# Patient Record
Sex: FEMALE | Race: WHITE | NOT HISPANIC OR LATINO | Employment: OTHER | URBAN - METROPOLITAN AREA
[De-identification: names, ages, dates, MRNs, and addresses within clinical notes are randomized per-mention and may not be internally consistent; named-entity substitution may affect disease eponyms.]

---

## 2021-03-11 ENCOUNTER — TRANSFERRED RECORDS (OUTPATIENT)
Dept: MULTI SPECIALTY CLINIC | Facility: CLINIC | Age: 26
End: 2021-03-11

## 2021-03-11 LAB
HPV ABSTRACT: NORMAL
PAP-ABSTRACT: NORMAL

## 2022-12-22 LAB — RUBELLA ANTIBODY IGG (EXTERNAL): NORMAL

## 2023-02-20 LAB
HEPATITIS B SURFACE ANTIGEN (EXTERNAL): NEGATIVE
HIV1+2 AB SERPL QL IA: NEGATIVE

## 2023-07-11 LAB — GROUP B STREPTOCOCCUS (EXTERNAL): NEGATIVE

## 2023-07-13 ENCOUNTER — HOSPITAL ENCOUNTER (OUTPATIENT)
Facility: CLINIC | Age: 28
Discharge: HOME | End: 2023-07-13
Attending: OBSTETRICS & GYNECOLOGY | Admitting: OBSTETRICS & GYNECOLOGY
Payer: COMMERCIAL

## 2023-07-13 VITALS — DIASTOLIC BLOOD PRESSURE: 89 MMHG | SYSTOLIC BLOOD PRESSURE: 137 MMHG | TEMPERATURE: 98.5 F | RESPIRATION RATE: 20 BRPM

## 2023-07-13 LAB
ALBUMIN MFR UR ELPH: 14.6 MG/DL
ALBUMIN SERPL BCG-MCNC: 3.4 G/DL (ref 3.5–5.2)
ALP SERPL-CCNC: 134 U/L (ref 35–104)
ALT SERPL W P-5'-P-CCNC: 8 U/L (ref 0–50)
ANION GAP SERPL CALCULATED.3IONS-SCNC: 11 MMOL/L (ref 7–15)
AST SERPL W P-5'-P-CCNC: 18 U/L (ref 0–45)
BILIRUB SERPL-MCNC: 0.6 MG/DL
BUN SERPL-MCNC: 7.4 MG/DL (ref 6–20)
CALCIUM SERPL-MCNC: 9.2 MG/DL (ref 8.6–10)
CHLORIDE SERPL-SCNC: 104 MMOL/L (ref 98–107)
CREAT SERPL-MCNC: 0.58 MG/DL (ref 0.51–0.95)
CREAT UR-MCNC: 100.9 MG/DL
DEPRECATED HCO3 PLAS-SCNC: 19 MMOL/L (ref 22–29)
ERYTHROCYTE [DISTWIDTH] IN BLOOD BY AUTOMATED COUNT: 13.5 % (ref 10–15)
GFR SERPL CREATININE-BSD FRML MDRD: >90 ML/MIN/1.73M2
GLUCOSE SERPL-MCNC: 109 MG/DL (ref 70–99)
HCT VFR BLD AUTO: 37.7 % (ref 35–47)
HGB BLD-MCNC: 12.8 G/DL (ref 11.7–15.7)
MCH RBC QN AUTO: 29.8 PG (ref 26.5–33)
MCHC RBC AUTO-ENTMCNC: 34 G/DL (ref 31.5–36.5)
MCV RBC AUTO: 88 FL (ref 78–100)
PLATELET # BLD AUTO: 239 10E3/UL (ref 150–450)
POTASSIUM SERPL-SCNC: 3.7 MMOL/L (ref 3.4–5.3)
PROT SERPL-MCNC: 6.5 G/DL (ref 6.4–8.3)
PROT/CREAT 24H UR: 0.14 MG/MG CR (ref 0–0.2)
RBC # BLD AUTO: 4.3 10E6/UL (ref 3.8–5.2)
SODIUM SERPL-SCNC: 134 MMOL/L (ref 136–145)
WBC # BLD AUTO: 13 10E3/UL (ref 4–11)

## 2023-07-13 PROCEDURE — 250N000013 HC RX MED GY IP 250 OP 250 PS 637: Performed by: OBSTETRICS & GYNECOLOGY

## 2023-07-13 PROCEDURE — 85027 COMPLETE CBC AUTOMATED: CPT | Performed by: OBSTETRICS & GYNECOLOGY

## 2023-07-13 PROCEDURE — 36415 COLL VENOUS BLD VENIPUNCTURE: CPT | Performed by: OBSTETRICS & GYNECOLOGY

## 2023-07-13 PROCEDURE — 84156 ASSAY OF PROTEIN URINE: CPT | Performed by: OBSTETRICS & GYNECOLOGY

## 2023-07-13 PROCEDURE — 80053 COMPREHEN METABOLIC PANEL: CPT | Performed by: OBSTETRICS & GYNECOLOGY

## 2023-07-13 PROCEDURE — G0463 HOSPITAL OUTPT CLINIC VISIT: HCPCS

## 2023-07-13 RX ORDER — CETIRIZINE HYDROCHLORIDE 10 MG/1
20 TABLET ORAL DAILY
COMMUNITY

## 2023-07-13 RX ORDER — ACETAMINOPHEN 500 MG
500-1000 TABLET ORAL EVERY 6 HOURS PRN
COMMUNITY

## 2023-07-13 RX ORDER — CYCLOBENZAPRINE HCL 10 MG
10 TABLET ORAL 3 TIMES DAILY
Status: DISCONTINUED | OUTPATIENT
Start: 2023-07-13 | End: 2023-07-14 | Stop reason: HOSPADM

## 2023-07-13 RX ORDER — PRENATAL VIT/IRON FUM/FOLIC AC 27MG-0.8MG
1 TABLET ORAL DAILY
COMMUNITY

## 2023-07-13 RX ORDER — FLUOXETINE 40 MG/1
80 CAPSULE ORAL DAILY
COMMUNITY

## 2023-07-13 RX ORDER — ONDANSETRON 8 MG/1
8 TABLET, FILM COATED ORAL EVERY 8 HOURS PRN
COMMUNITY
End: 2023-08-01

## 2023-07-13 RX ORDER — ASPIRIN 81 MG/1
81 TABLET, CHEWABLE ORAL DAILY
Status: ON HOLD | COMMUNITY
End: 2023-07-28

## 2023-07-13 RX ADMIN — CYCLOBENZAPRINE 10 MG: 10 TABLET, FILM COATED ORAL at 22:10

## 2023-07-13 ASSESSMENT — ACTIVITIES OF DAILY LIVING (ADL): ADLS_ACUITY_SCORE: 31

## 2023-07-14 NOTE — DISCHARGE INSTRUCTIONS
Discharge Instruction for Undelivered Patients      You were seen for:  abdominal tightness and ALONZO  We Consulted: MD Blackwell  You had (Test or Medicine): Flexeril, NST, CBC with platelets, CMP, protein cr ratio      Diet:   Drink 8 to 12 glasses of liquids (milk, juice, water) every day.  You may eat meals and snacks.     Activity:  Count fetal kicks everyday (see handout)  Call your doctor or nurse midwife if your baby is moving less than usual.     Call your provider if you notice:  Swelling in your face or increased swelling in your hands or legs.  Headaches that are not relieved by Tylenol (acetaminophen).  Changes in your vision (blurring: seeing spots or stars.)  Nausea (sick to your stomach) and vomiting (throwing up).   Weight gain of 5 pounds or more per week.  Heartburn that doesn't go away.  Signs of bladder infection: pain when you urinate (use the toilet), need to go more often and more urgently.  The bag of brown (rupture of membranes) breaks, or you notice leaking in your underwear.  Bright red blood in your underwear.  Abdominal (lower belly) or stomach pain.  For first baby: Contractions (tightening) less than 5 minutes apart for one hour or more.  Second (plus) baby: Contractions (tightening) less than 10 minutes apart and getting stronger.  *If less than 34 weeks: Contractions (tightening) more than 6 times in one hour.  Increase or change in vaginal discharge (note the color and amount)      Follow-up:  As scheduled in the clinic

## 2023-07-14 NOTE — PROVIDER NOTIFICATION
07/13/23 2229   Provider Notification   Provider Name/Title MD Blackwell   Method of Notification Phone   Notification Reason Status Update   Updated provider with labs results and status update. CBC with platelets and CMP including protein cr ratio WNL. Pt given flexeril at 2210. Pt feeling intermittent cramping, toco presents with intermittent ctx. FHT remains christiano I    TORB to place discharge orders and give pertinent education. Pt and partner agreeable with plan. Discharged at 2250

## 2023-07-14 NOTE — PROVIDER NOTIFICATION
23   Provider Notification   Provider Name/Title MD Blackwell   Method of Notification Phone   Notification Reason Patient Arrived   Updated provider on patient arrival. Pt is a , 36.4, A pos, GBS pending. Pregnancy complicated with hyperemesis and MDD/DARLING. Pt c/o HA that began yesterday morning, has been dull and constant, 2/10 pain, not resolved with 500mg of tylenol. Pt also c/o abdominal tightness and uterine cramping that only occurs with postural changes and when the pt puts pressure on her abdomen. Pt states that she has had some int RUQ muscle pain for the past few weeks, not currently experiencing pain. Also c/o more than 3lb weight gain within the week, states she has been doing IV hydration therapy. Pt states she noticed bloody mucousy vaginal discharge after her cervical exam this past Tuesday.     VSS and HTT WNL with exception of 3+ bicep reflex on right side, no significant edema, no other s/s of preE. Haigler presents with ctx q 4-5 min with uterine irritability, FHT christiano I, 1x decel. Pt states she took tylenol at 5pm and HA remains. Pt has constant nausea, has not had an emesis episode today.     TORB for preE labs- CBC with plates, CMP, protein cr ratio. Place order for 10 mg of flexeril for muscle pain. Will update with lab results

## 2023-07-14 NOTE — PLAN OF CARE
Data: Patient presented to [facility]: 2023  7:49 PM.  Reason for maternal/fetal assessment is abdominal tightness and HA. Patient reports having a dull constant HA since yesterday morning, not resolved with tylenol. Also reports intermittent abdominal/uterine tightness and cramping with postural changes and pressure is applied to abdomen.  Patient is a .  Prenatal record reviewed. Pregnancy  has been complicated by hyperemesis gravidarum and MDD/DARLING.  Gestational Age 36w4d. VSS. Fetal movement active. Patient denies leaking of vaginal fluid/rupture of membranes, visual disturbances, epigastric or URQ pain, significant edema. Support person is present.   Action: Verbal consent for EFM. Triage assessment completed. Bill of rights reviewed.  Response: Patient verbalized agreement with plan. Will contact Dr Peyton Blackwell with update and for further orders.

## 2023-07-24 ENCOUNTER — HOSPITAL ENCOUNTER (OUTPATIENT)
Facility: CLINIC | Age: 28
Discharge: HOME | End: 2023-07-24
Attending: OBSTETRICS & GYNECOLOGY | Admitting: OBSTETRICS & GYNECOLOGY
Payer: COMMERCIAL

## 2023-07-24 VITALS — RESPIRATION RATE: 16 BRPM | TEMPERATURE: 98.9 F | DIASTOLIC BLOOD PRESSURE: 77 MMHG | SYSTOLIC BLOOD PRESSURE: 138 MMHG

## 2023-07-24 PROBLEM — Z36.89 ENCOUNTER FOR TRIAGE IN PREGNANT PATIENT: Status: ACTIVE | Noted: 2023-07-24

## 2023-07-24 LAB — CRYSTALS AMN MICRO: NORMAL

## 2023-07-24 PROCEDURE — G0463 HOSPITAL OUTPT CLINIC VISIT: HCPCS

## 2023-07-24 RX ORDER — LIDOCAINE 40 MG/G
CREAM TOPICAL
Status: DISCONTINUED | OUTPATIENT
Start: 2023-07-24 | End: 2023-07-24 | Stop reason: HOSPADM

## 2023-07-24 ASSESSMENT — ACTIVITIES OF DAILY LIVING (ADL): ADLS_ACUITY_SCORE: 35

## 2023-07-25 ENCOUNTER — ANESTHESIA (OUTPATIENT)
Dept: OBGYN | Facility: CLINIC | Age: 28
End: 2023-07-25
Payer: COMMERCIAL

## 2023-07-25 ENCOUNTER — HOSPITAL ENCOUNTER (INPATIENT)
Facility: CLINIC | Age: 28
LOS: 3 days | Discharge: HOME | End: 2023-07-28
Attending: OBSTETRICS & GYNECOLOGY | Admitting: OBSTETRICS & GYNECOLOGY
Payer: COMMERCIAL

## 2023-07-25 ENCOUNTER — ANESTHESIA EVENT (OUTPATIENT)
Dept: OBGYN | Facility: CLINIC | Age: 28
End: 2023-07-25
Payer: COMMERCIAL

## 2023-07-25 DIAGNOSIS — O14.93 PREECLAMPSIA, THIRD TRIMESTER: ICD-10-CM

## 2023-07-25 LAB
ABO/RH(D): NORMAL
ALBUMIN MFR UR ELPH: 17.7 MG/DL
ALBUMIN SERPL BCG-MCNC: 2.6 G/DL (ref 3.5–5.2)
ALBUMIN SERPL BCG-MCNC: 3.1 G/DL (ref 3.5–5.2)
ALP SERPL-CCNC: 121 U/L (ref 35–104)
ALP SERPL-CCNC: 131 U/L (ref 35–104)
ALT SERPL W P-5'-P-CCNC: 7 U/L (ref 0–50)
ALT SERPL W P-5'-P-CCNC: 8 U/L (ref 0–50)
ANION GAP SERPL CALCULATED.3IONS-SCNC: 10 MMOL/L (ref 7–15)
ANION GAP SERPL CALCULATED.3IONS-SCNC: 14 MMOL/L (ref 7–15)
ANTIBODY SCREEN: NEGATIVE
AST SERPL W P-5'-P-CCNC: 17 U/L (ref 0–45)
AST SERPL W P-5'-P-CCNC: 19 U/L (ref 0–45)
BASOPHILS # BLD AUTO: 0 10E3/UL (ref 0–0.2)
BASOPHILS NFR BLD AUTO: 0 %
BILIRUB SERPL-MCNC: 0.4 MG/DL
BILIRUB SERPL-MCNC: 0.4 MG/DL
BUN SERPL-MCNC: 7.7 MG/DL (ref 6–20)
BUN SERPL-MCNC: 8.3 MG/DL (ref 6–20)
CALCIUM SERPL-MCNC: 8.8 MG/DL (ref 8.6–10)
CALCIUM SERPL-MCNC: 9 MG/DL (ref 8.6–10)
CHLORIDE SERPL-SCNC: 101 MMOL/L (ref 98–107)
CHLORIDE SERPL-SCNC: 101 MMOL/L (ref 98–107)
CREAT SERPL-MCNC: 0.6 MG/DL (ref 0.51–0.95)
CREAT SERPL-MCNC: 0.62 MG/DL (ref 0.51–0.95)
CREAT UR-MCNC: 45.4 MG/DL
DEPRECATED HCO3 PLAS-SCNC: 19 MMOL/L (ref 22–29)
DEPRECATED HCO3 PLAS-SCNC: 20 MMOL/L (ref 22–29)
EOSINOPHIL # BLD AUTO: 0 10E3/UL (ref 0–0.7)
EOSINOPHIL NFR BLD AUTO: 0 %
ERYTHROCYTE [DISTWIDTH] IN BLOOD BY AUTOMATED COUNT: 13.5 % (ref 10–15)
ERYTHROCYTE [DISTWIDTH] IN BLOOD BY AUTOMATED COUNT: 13.6 % (ref 10–15)
GFR SERPL CREATININE-BSD FRML MDRD: >90 ML/MIN/1.73M2
GFR SERPL CREATININE-BSD FRML MDRD: >90 ML/MIN/1.73M2
GLUCOSE SERPL-MCNC: 101 MG/DL (ref 70–99)
GLUCOSE SERPL-MCNC: 114 MG/DL (ref 70–99)
HCT VFR BLD AUTO: 30.4 % (ref 35–47)
HCT VFR BLD AUTO: 35.4 % (ref 35–47)
HGB BLD-MCNC: 10.4 G/DL (ref 11.7–15.7)
HGB BLD-MCNC: 12 G/DL (ref 11.7–15.7)
IMM GRANULOCYTES # BLD: 0.1 10E3/UL
IMM GRANULOCYTES NFR BLD: 0 %
LYMPHOCYTES # BLD AUTO: 1.8 10E3/UL (ref 0.8–5.3)
LYMPHOCYTES NFR BLD AUTO: 11 %
MCH RBC QN AUTO: 29.8 PG (ref 26.5–33)
MCH RBC QN AUTO: 30.1 PG (ref 26.5–33)
MCHC RBC AUTO-ENTMCNC: 33.9 G/DL (ref 31.5–36.5)
MCHC RBC AUTO-ENTMCNC: 34.2 G/DL (ref 31.5–36.5)
MCV RBC AUTO: 88 FL (ref 78–100)
MCV RBC AUTO: 88 FL (ref 78–100)
MONOCYTES # BLD AUTO: 0.8 10E3/UL (ref 0–1.3)
MONOCYTES NFR BLD AUTO: 5 %
NEUTROPHILS # BLD AUTO: 13.1 10E3/UL (ref 1.6–8.3)
NEUTROPHILS NFR BLD AUTO: 84 %
NRBC # BLD AUTO: 0 10E3/UL
NRBC BLD AUTO-RTO: 0 /100
PLATELET # BLD AUTO: 170 10E3/UL (ref 150–450)
PLATELET # BLD AUTO: 204 10E3/UL (ref 150–450)
POTASSIUM SERPL-SCNC: 3.9 MMOL/L (ref 3.4–5.3)
POTASSIUM SERPL-SCNC: 4.1 MMOL/L (ref 3.4–5.3)
PROT SERPL-MCNC: 5.2 G/DL (ref 6.4–8.3)
PROT SERPL-MCNC: 6.2 G/DL (ref 6.4–8.3)
PROT/CREAT 24H UR: 0.39 MG/MG CR (ref 0–0.2)
RBC # BLD AUTO: 3.45 10E6/UL (ref 3.8–5.2)
RBC # BLD AUTO: 4.03 10E6/UL (ref 3.8–5.2)
SODIUM SERPL-SCNC: 131 MMOL/L (ref 136–145)
SODIUM SERPL-SCNC: 134 MMOL/L (ref 136–145)
SPECIMEN EXPIRATION DATE: NORMAL
T PALLIDUM AB SER QL: NONREACTIVE
WBC # BLD AUTO: 10.6 10E3/UL (ref 4–11)
WBC # BLD AUTO: 15.8 10E3/UL (ref 4–11)

## 2023-07-25 PROCEDURE — 250N000009 HC RX 250: Performed by: OBSTETRICS & GYNECOLOGY

## 2023-07-25 PROCEDURE — 36415 COLL VENOUS BLD VENIPUNCTURE: CPT | Performed by: OBSTETRICS & GYNECOLOGY

## 2023-07-25 PROCEDURE — 250N000013 HC RX MED GY IP 250 OP 250 PS 637: Performed by: OBSTETRICS & GYNECOLOGY

## 2023-07-25 PROCEDURE — 84450 TRANSFERASE (AST) (SGOT): CPT | Performed by: OBSTETRICS & GYNECOLOGY

## 2023-07-25 PROCEDURE — 370N000003 HC ANESTHESIA WARD SERVICE: Performed by: ANESTHESIOLOGY

## 2023-07-25 PROCEDURE — 86901 BLOOD TYPING SEROLOGIC RH(D): CPT | Performed by: OBSTETRICS & GYNECOLOGY

## 2023-07-25 PROCEDURE — 120N000001 HC R&B MED SURG/OB

## 2023-07-25 PROCEDURE — 250N000011 HC RX IP 250 OP 636: Mod: JZ | Performed by: ANESTHESIOLOGY

## 2023-07-25 PROCEDURE — 250N000011 HC RX IP 250 OP 636: Performed by: ANESTHESIOLOGY

## 2023-07-25 PROCEDURE — 0KQM0ZZ REPAIR PERINEUM MUSCLE, OPEN APPROACH: ICD-10-PCS | Performed by: OBSTETRICS & GYNECOLOGY

## 2023-07-25 PROCEDURE — 3E0R3BZ INTRODUCTION OF ANESTHETIC AGENT INTO SPINAL CANAL, PERCUTANEOUS APPROACH: ICD-10-PCS | Performed by: ANESTHESIOLOGY

## 2023-07-25 PROCEDURE — 258N000003 HC RX IP 258 OP 636: Performed by: OBSTETRICS & GYNECOLOGY

## 2023-07-25 PROCEDURE — 00HU33Z INSERTION OF INFUSION DEVICE INTO SPINAL CANAL, PERCUTANEOUS APPROACH: ICD-10-PCS | Performed by: ANESTHESIOLOGY

## 2023-07-25 PROCEDURE — 86780 TREPONEMA PALLIDUM: CPT | Performed by: OBSTETRICS & GYNECOLOGY

## 2023-07-25 PROCEDURE — 722N000001 HC LABOR CARE VAGINAL DELIVERY SINGLE

## 2023-07-25 PROCEDURE — 10907ZC DRAINAGE OF AMNIOTIC FLUID, THERAPEUTIC FROM PRODUCTS OF CONCEPTION, VIA NATURAL OR ARTIFICIAL OPENING: ICD-10-PCS | Performed by: OBSTETRICS & GYNECOLOGY

## 2023-07-25 PROCEDURE — 250N000011 HC RX IP 250 OP 636: Performed by: OBSTETRICS & GYNECOLOGY

## 2023-07-25 PROCEDURE — 86850 RBC ANTIBODY SCREEN: CPT | Performed by: OBSTETRICS & GYNECOLOGY

## 2023-07-25 PROCEDURE — 84156 ASSAY OF PROTEIN URINE: CPT | Performed by: OBSTETRICS & GYNECOLOGY

## 2023-07-25 PROCEDURE — 250N000011 HC RX IP 250 OP 636: Mod: JZ | Performed by: OBSTETRICS & GYNECOLOGY

## 2023-07-25 PROCEDURE — 250N000009 HC RX 250: Performed by: ANESTHESIOLOGY

## 2023-07-25 PROCEDURE — 0UQMXZZ REPAIR VULVA, EXTERNAL APPROACH: ICD-10-PCS | Performed by: OBSTETRICS & GYNECOLOGY

## 2023-07-25 PROCEDURE — 85014 HEMATOCRIT: CPT | Performed by: OBSTETRICS & GYNECOLOGY

## 2023-07-25 PROCEDURE — 85027 COMPLETE CBC AUTOMATED: CPT | Performed by: OBSTETRICS & GYNECOLOGY

## 2023-07-25 PROCEDURE — 80053 COMPREHEN METABOLIC PANEL: CPT | Performed by: OBSTETRICS & GYNECOLOGY

## 2023-07-25 RX ORDER — TRANEXAMIC ACID 10 MG/ML
1 INJECTION, SOLUTION INTRAVENOUS EVERY 30 MIN PRN
Status: DISCONTINUED | OUTPATIENT
Start: 2023-07-25 | End: 2023-07-25 | Stop reason: HOSPADM

## 2023-07-25 RX ORDER — HYDROCORTISONE 25 MG/G
CREAM TOPICAL 3 TIMES DAILY PRN
Status: DISCONTINUED | OUTPATIENT
Start: 2023-07-25 | End: 2023-07-28 | Stop reason: HOSPADM

## 2023-07-25 RX ORDER — IBUPROFEN 800 MG/1
800 TABLET, FILM COATED ORAL EVERY 6 HOURS PRN
Status: DISCONTINUED | OUTPATIENT
Start: 2023-07-25 | End: 2023-07-28 | Stop reason: HOSPADM

## 2023-07-25 RX ORDER — OXYTOCIN/0.9 % SODIUM CHLORIDE 30/500 ML
340 PLASTIC BAG, INJECTION (ML) INTRAVENOUS CONTINUOUS PRN
Status: DISCONTINUED | OUTPATIENT
Start: 2023-07-25 | End: 2023-07-28 | Stop reason: HOSPADM

## 2023-07-25 RX ORDER — CETIRIZINE HYDROCHLORIDE 10 MG/1
20 TABLET ORAL DAILY
Status: DISCONTINUED | OUTPATIENT
Start: 2023-07-25 | End: 2023-07-28 | Stop reason: HOSPADM

## 2023-07-25 RX ORDER — MISOPROSTOL 200 UG/1
400 TABLET ORAL
Status: DISCONTINUED | OUTPATIENT
Start: 2023-07-25 | End: 2023-07-25 | Stop reason: HOSPADM

## 2023-07-25 RX ORDER — OXYTOCIN 10 [USP'U]/ML
10 INJECTION, SOLUTION INTRAMUSCULAR; INTRAVENOUS
Status: DISCONTINUED | OUTPATIENT
Start: 2023-07-25 | End: 2023-07-27

## 2023-07-25 RX ORDER — NIFEDIPINE 30 MG/1
60 TABLET, EXTENDED RELEASE ORAL DAILY
Status: DISCONTINUED | OUTPATIENT
Start: 2023-07-26 | End: 2023-07-28 | Stop reason: HOSPADM

## 2023-07-25 RX ORDER — BISACODYL 10 MG
10 SUPPOSITORY, RECTAL RECTAL DAILY PRN
Status: DISCONTINUED | OUTPATIENT
Start: 2023-07-25 | End: 2023-07-28 | Stop reason: HOSPADM

## 2023-07-25 RX ORDER — CITRIC ACID/SODIUM CITRATE 334-500MG
30 SOLUTION, ORAL ORAL
Status: DISCONTINUED | OUTPATIENT
Start: 2023-07-25 | End: 2023-07-25 | Stop reason: HOSPADM

## 2023-07-25 RX ORDER — OXYTOCIN/0.9 % SODIUM CHLORIDE 30/500 ML
1-24 PLASTIC BAG, INJECTION (ML) INTRAVENOUS CONTINUOUS
Status: DISCONTINUED | OUTPATIENT
Start: 2023-07-25 | End: 2023-07-25 | Stop reason: HOSPADM

## 2023-07-25 RX ORDER — PROCHLORPERAZINE MALEATE 10 MG
10 TABLET ORAL EVERY 6 HOURS PRN
Status: DISCONTINUED | OUTPATIENT
Start: 2023-07-25 | End: 2023-07-25 | Stop reason: HOSPADM

## 2023-07-25 RX ORDER — ONDANSETRON 4 MG/1
4 TABLET, ORALLY DISINTEGRATING ORAL EVERY 6 HOURS PRN
Status: DISCONTINUED | OUTPATIENT
Start: 2023-07-25 | End: 2023-07-25 | Stop reason: HOSPADM

## 2023-07-25 RX ORDER — NALOXONE HYDROCHLORIDE 0.4 MG/ML
0.4 INJECTION, SOLUTION INTRAMUSCULAR; INTRAVENOUS; SUBCUTANEOUS
Status: DISCONTINUED | OUTPATIENT
Start: 2023-07-25 | End: 2023-07-25 | Stop reason: HOSPADM

## 2023-07-25 RX ORDER — METHYLERGONOVINE MALEATE 0.2 MG/ML
200 INJECTION INTRAVENOUS
Status: DISCONTINUED | OUTPATIENT
Start: 2023-07-25 | End: 2023-07-25 | Stop reason: HOSPADM

## 2023-07-25 RX ORDER — MISOPROSTOL 200 UG/1
400 TABLET ORAL
Status: DISCONTINUED | OUTPATIENT
Start: 2023-07-25 | End: 2023-07-28 | Stop reason: HOSPADM

## 2023-07-25 RX ORDER — ACETAMINOPHEN 325 MG/1
650 TABLET ORAL EVERY 4 HOURS PRN
Status: DISCONTINUED | OUTPATIENT
Start: 2023-07-25 | End: 2023-07-28 | Stop reason: HOSPADM

## 2023-07-25 RX ORDER — OXYTOCIN 10 [USP'U]/ML
10 INJECTION, SOLUTION INTRAMUSCULAR; INTRAVENOUS
Status: DISCONTINUED | OUTPATIENT
Start: 2023-07-25 | End: 2023-07-25 | Stop reason: HOSPADM

## 2023-07-25 RX ORDER — PROCHLORPERAZINE 25 MG
25 SUPPOSITORY, RECTAL RECTAL EVERY 12 HOURS PRN
Status: DISCONTINUED | OUTPATIENT
Start: 2023-07-25 | End: 2023-07-25 | Stop reason: HOSPADM

## 2023-07-25 RX ORDER — NALBUPHINE HYDROCHLORIDE 20 MG/ML
2.5-5 INJECTION, SOLUTION INTRAMUSCULAR; INTRAVENOUS; SUBCUTANEOUS EVERY 6 HOURS PRN
Status: DISCONTINUED | OUTPATIENT
Start: 2023-07-25 | End: 2023-07-25

## 2023-07-25 RX ORDER — BUPIVACAINE HYDROCHLORIDE 2.5 MG/ML
INJECTION, SOLUTION EPIDURAL; INFILTRATION; INTRACAUDAL
Status: COMPLETED | OUTPATIENT
Start: 2023-07-25 | End: 2023-07-25

## 2023-07-25 RX ORDER — FENTANYL CITRATE 50 UG/ML
100 INJECTION, SOLUTION INTRAMUSCULAR; INTRAVENOUS
Status: DISCONTINUED | OUTPATIENT
Start: 2023-07-25 | End: 2023-07-25

## 2023-07-25 RX ORDER — LIDOCAINE 40 MG/G
CREAM TOPICAL
Status: DISCONTINUED | OUTPATIENT
Start: 2023-07-25 | End: 2023-07-25 | Stop reason: HOSPADM

## 2023-07-25 RX ORDER — MISOPROSTOL 200 UG/1
800 TABLET ORAL
Status: DISCONTINUED | OUTPATIENT
Start: 2023-07-25 | End: 2023-07-28 | Stop reason: HOSPADM

## 2023-07-25 RX ORDER — OXYTOCIN/0.9 % SODIUM CHLORIDE 30/500 ML
340 PLASTIC BAG, INJECTION (ML) INTRAVENOUS CONTINUOUS PRN
Status: DISCONTINUED | OUTPATIENT
Start: 2023-07-25 | End: 2023-07-25 | Stop reason: HOSPADM

## 2023-07-25 RX ORDER — NIFEDIPINE 30 MG/1
30 TABLET, EXTENDED RELEASE ORAL DAILY
Status: DISCONTINUED | OUTPATIENT
Start: 2023-07-25 | End: 2023-07-25

## 2023-07-25 RX ORDER — KETOROLAC TROMETHAMINE 30 MG/ML
30 INJECTION, SOLUTION INTRAMUSCULAR; INTRAVENOUS
Status: DISCONTINUED | OUTPATIENT
Start: 2023-07-25 | End: 2023-07-25 | Stop reason: ALTCHOICE

## 2023-07-25 RX ORDER — HYDRALAZINE HYDROCHLORIDE 20 MG/ML
10 INJECTION INTRAMUSCULAR; INTRAVENOUS
Status: DISCONTINUED | OUTPATIENT
Start: 2023-07-25 | End: 2023-07-28 | Stop reason: HOSPADM

## 2023-07-25 RX ORDER — NIFEDIPINE 30 MG/1
30 TABLET, EXTENDED RELEASE ORAL ONCE
Status: COMPLETED | OUTPATIENT
Start: 2023-07-25 | End: 2023-07-25

## 2023-07-25 RX ORDER — FENTANYL CITRATE-0.9 % NACL/PF 10 MCG/ML
100 PLASTIC BAG, INJECTION (ML) INTRAVENOUS EVERY 5 MIN PRN
Status: DISCONTINUED | OUTPATIENT
Start: 2023-07-25 | End: 2023-07-25 | Stop reason: HOSPADM

## 2023-07-25 RX ORDER — DOCUSATE SODIUM 100 MG/1
100 CAPSULE, LIQUID FILLED ORAL DAILY
Status: DISCONTINUED | OUTPATIENT
Start: 2023-07-26 | End: 2023-07-28 | Stop reason: HOSPADM

## 2023-07-25 RX ORDER — SODIUM CHLORIDE, SODIUM LACTATE, POTASSIUM CHLORIDE, CALCIUM CHLORIDE 600; 310; 30; 20 MG/100ML; MG/100ML; MG/100ML; MG/100ML
INJECTION, SOLUTION INTRAVENOUS CONTINUOUS PRN
Status: DISCONTINUED | OUTPATIENT
Start: 2023-07-25 | End: 2023-07-25 | Stop reason: HOSPADM

## 2023-07-25 RX ORDER — SODIUM CHLORIDE, SODIUM LACTATE, POTASSIUM CHLORIDE, CALCIUM CHLORIDE 600; 310; 30; 20 MG/100ML; MG/100ML; MG/100ML; MG/100ML
INJECTION, SOLUTION INTRAVENOUS CONTINUOUS
Status: DISCONTINUED | OUTPATIENT
Start: 2023-07-25 | End: 2023-07-25 | Stop reason: HOSPADM

## 2023-07-25 RX ORDER — OXYTOCIN/0.9 % SODIUM CHLORIDE 30/500 ML
100-340 PLASTIC BAG, INJECTION (ML) INTRAVENOUS CONTINUOUS PRN
Status: DISCONTINUED | OUTPATIENT
Start: 2023-07-25 | End: 2023-07-27

## 2023-07-25 RX ORDER — NALOXONE HYDROCHLORIDE 0.4 MG/ML
0.2 INJECTION, SOLUTION INTRAMUSCULAR; INTRAVENOUS; SUBCUTANEOUS
Status: DISCONTINUED | OUTPATIENT
Start: 2023-07-25 | End: 2023-07-25 | Stop reason: HOSPADM

## 2023-07-25 RX ORDER — MISOPROSTOL 200 UG/1
800 TABLET ORAL
Status: DISCONTINUED | OUTPATIENT
Start: 2023-07-25 | End: 2023-07-25 | Stop reason: HOSPADM

## 2023-07-25 RX ORDER — ACETAMINOPHEN 325 MG/1
650 TABLET ORAL EVERY 4 HOURS PRN
Status: DISCONTINUED | OUTPATIENT
Start: 2023-07-25 | End: 2023-07-25 | Stop reason: HOSPADM

## 2023-07-25 RX ORDER — OXYTOCIN 10 [USP'U]/ML
10 INJECTION, SOLUTION INTRAMUSCULAR; INTRAVENOUS
Status: DISCONTINUED | OUTPATIENT
Start: 2023-07-25 | End: 2023-07-28 | Stop reason: HOSPADM

## 2023-07-25 RX ORDER — FENTANYL CITRATE 50 UG/ML
100 INJECTION, SOLUTION INTRAMUSCULAR; INTRAVENOUS
Status: DISCONTINUED | OUTPATIENT
Start: 2023-07-25 | End: 2023-07-25 | Stop reason: HOSPADM

## 2023-07-25 RX ORDER — LABETALOL HYDROCHLORIDE 5 MG/ML
20-80 INJECTION, SOLUTION INTRAVENOUS EVERY 10 MIN PRN
Status: DISCONTINUED | OUTPATIENT
Start: 2023-07-25 | End: 2023-07-28 | Stop reason: HOSPADM

## 2023-07-25 RX ORDER — AMOXICILLIN 250 MG
1 CAPSULE ORAL DAILY
COMMUNITY
End: 2023-08-01

## 2023-07-25 RX ORDER — METOCLOPRAMIDE 10 MG/1
10 TABLET ORAL EVERY 6 HOURS PRN
Status: DISCONTINUED | OUTPATIENT
Start: 2023-07-25 | End: 2023-07-25 | Stop reason: HOSPADM

## 2023-07-25 RX ORDER — CARBOPROST TROMETHAMINE 250 UG/ML
250 INJECTION, SOLUTION INTRAMUSCULAR
Status: DISCONTINUED | OUTPATIENT
Start: 2023-07-25 | End: 2023-07-28 | Stop reason: HOSPADM

## 2023-07-25 RX ORDER — CARBOPROST TROMETHAMINE 250 UG/ML
250 INJECTION, SOLUTION INTRAMUSCULAR
Status: DISCONTINUED | OUTPATIENT
Start: 2023-07-25 | End: 2023-07-25 | Stop reason: HOSPADM

## 2023-07-25 RX ORDER — TRANEXAMIC ACID 10 MG/ML
1 INJECTION, SOLUTION INTRAVENOUS EVERY 30 MIN PRN
Status: DISCONTINUED | OUTPATIENT
Start: 2023-07-25 | End: 2023-07-28 | Stop reason: HOSPADM

## 2023-07-25 RX ORDER — ONDANSETRON 2 MG/ML
4 INJECTION INTRAMUSCULAR; INTRAVENOUS EVERY 6 HOURS PRN
Status: DISCONTINUED | OUTPATIENT
Start: 2023-07-25 | End: 2023-07-25 | Stop reason: HOSPADM

## 2023-07-25 RX ORDER — METHYLERGONOVINE MALEATE 0.2 MG/ML
200 INJECTION INTRAVENOUS
Status: DISCONTINUED | OUTPATIENT
Start: 2023-07-25 | End: 2023-07-28 | Stop reason: HOSPADM

## 2023-07-25 RX ORDER — LIDOCAINE HCL/EPINEPHRINE/PF 2%-1:200K
VIAL (ML) INJECTION
Status: COMPLETED | OUTPATIENT
Start: 2023-07-25 | End: 2023-07-25

## 2023-07-25 RX ORDER — ENOXAPARIN SODIUM 100 MG/ML
40 INJECTION SUBCUTANEOUS EVERY 24 HOURS
Status: DISCONTINUED | OUTPATIENT
Start: 2023-07-25 | End: 2023-07-27

## 2023-07-25 RX ORDER — IBUPROFEN 800 MG/1
800 TABLET, FILM COATED ORAL
Status: DISCONTINUED | OUTPATIENT
Start: 2023-07-25 | End: 2023-07-25 | Stop reason: ALTCHOICE

## 2023-07-25 RX ORDER — MODIFIED LANOLIN
OINTMENT (GRAM) TOPICAL
Status: DISCONTINUED | OUTPATIENT
Start: 2023-07-25 | End: 2023-07-28 | Stop reason: HOSPADM

## 2023-07-25 RX ORDER — METOCLOPRAMIDE HYDROCHLORIDE 5 MG/ML
10 INJECTION INTRAMUSCULAR; INTRAVENOUS EVERY 6 HOURS PRN
Status: DISCONTINUED | OUTPATIENT
Start: 2023-07-25 | End: 2023-07-25 | Stop reason: HOSPADM

## 2023-07-25 RX ADMIN — IBUPROFEN 800 MG: 800 TABLET ORAL at 14:17

## 2023-07-25 RX ADMIN — FLUOXETINE 80 MG: 20 CAPSULE ORAL at 20:57

## 2023-07-25 RX ADMIN — SODIUM CHLORIDE, POTASSIUM CHLORIDE, SODIUM LACTATE AND CALCIUM CHLORIDE: 600; 310; 30; 20 INJECTION, SOLUTION INTRAVENOUS at 04:57

## 2023-07-25 RX ADMIN — MISOPROSTOL 400 MCG: 200 TABLET ORAL at 10:56

## 2023-07-25 RX ADMIN — FENTANYL CITRATE 100 MCG: 50 INJECTION, SOLUTION INTRAMUSCULAR; INTRAVENOUS at 04:44

## 2023-07-25 RX ADMIN — BUPIVACAINE HYDROCHLORIDE 10 ML: 2.5 INJECTION, SOLUTION EPIDURAL; INFILTRATION; INTRACAUDAL at 05:20

## 2023-07-25 RX ADMIN — Medication 2 MILLI-UNITS/MIN: at 08:30

## 2023-07-25 RX ADMIN — Medication: at 05:21

## 2023-07-25 RX ADMIN — CETIRIZINE HYDROCHLORIDE 20 MG: 10 TABLET, FILM COATED ORAL at 20:57

## 2023-07-25 RX ADMIN — ACETAMINOPHEN 650 MG: 325 TABLET, FILM COATED ORAL at 21:53

## 2023-07-25 RX ADMIN — IBUPROFEN 800 MG: 800 TABLET ORAL at 20:57

## 2023-07-25 RX ADMIN — NIFEDIPINE 30 MG: 30 TABLET, FILM COATED, EXTENDED RELEASE ORAL at 08:55

## 2023-07-25 RX ADMIN — ACETAMINOPHEN 650 MG: 325 TABLET, FILM COATED ORAL at 14:17

## 2023-07-25 RX ADMIN — ENOXAPARIN SODIUM 40 MG: 40 INJECTION SUBCUTANEOUS at 20:57

## 2023-07-25 RX ADMIN — LIDOCAINE HYDROCHLORIDE,EPINEPHRINE BITARTRATE 1 ML: 20; .005 INJECTION, SOLUTION EPIDURAL; INFILTRATION; INTRACAUDAL; PERINEURAL at 05:18

## 2023-07-25 RX ADMIN — ACETAMINOPHEN 650 MG: 325 TABLET, FILM COATED ORAL at 17:45

## 2023-07-25 RX ADMIN — NIFEDIPINE 30 MG: 30 TABLET, FILM COATED, EXTENDED RELEASE ORAL at 17:45

## 2023-07-25 ASSESSMENT — ACTIVITIES OF DAILY LIVING (ADL)
ADLS_ACUITY_SCORE: 18

## 2023-07-25 NOTE — PLAN OF CARE
Data: Ximena Brewster transferred to 446 via wheelchair at 1250. Baby transferred via parent's arms.  Action: Receiving unit notified of transfer: Yes. Patient and family notified of room change. Report given to Christie RAYA at 1255. Belongings sent to receiving unit. Accompanied by Registered Nurse. Oriented patient to surroundings. Call light within reach. ID bands double-checked with receiving RN.  Response: Patient tolerated transfer and is stable.  Patients mobililty level scored using the bedside mobility assistance tool (BMAT). Patient is at a mobility level test number: 3. Mobility equipment used: wheelchair. Required assist of 1 staff members. Further use of BMAT scoring required.

## 2023-07-25 NOTE — CARE PLAN
Data: Patient assessed in the [facility] for leaking vaginal fluid.  Cervical exam dilated to 2.5, short, effaced 50-70%, and soft. Membranes intact per fern swab, visual spec exam, SVE.  Contractions occurring occasionally and palpate mild.  Action:  Presumed adequate fetal oxygenation documented (see flow record). Discharge instructions reviewed.  Patient instructed to report change in fetal movement, vaginal leaking of fluid or bleeding, abdominal pain, or any concerns related to the pregnancy to her nurse/physician.    Response: Orders to discharge home per .  Patient verbalized understanding of education and verbalized agreement with plan. Discharged to home at 2050.

## 2023-07-25 NOTE — PROVIDER NOTIFICATION
07/25/23 0541   Provider Notification   Provider Name/Title Dr. Castillo   Method of Notification Phone     Pt resting comfortably with epidural. BP spiked to 167/76 with a non-severe 15 min recheck. Spiked again to 170/74. If 15 min recheck severe, MD wants pt to be treated with 20 mg IV labetalol algorithm. If severe, start magnesium at 4 g loading dose with 2 g maintenance. Will notify MD with SVE after epidural.

## 2023-07-25 NOTE — PROVIDER NOTIFICATION
23 0354   Provider Notification   Provider Name/Title Dr. Castillo   Method of Notification Phone      at 39 weeks and 2 days gestation here for labor. GBS neg. Pregnancy complicated by N&V for which is is receiving IVF weekly. Short cervix, MTHFR gene mutation, and MDD/DARLING for which she is taking prozac. Pt was here in triage for r/o SROM last evening. Not ruptured at that time. SVE was 2.5/70/-2. Pt SVE now is 6/90/0 with BOW. Plans for epidural Ctx 2-3 min, breathing through them. FHR moderate variability with accels, no decels. Initial BP was 170/87 with 15 min recheck 146/87. MD wants CBC, CMP, type and screen, and pr/cr urine ratio. Nino update after labs and epidural.

## 2023-07-25 NOTE — PROVIDER NOTIFICATION
Call received from Dr Justice -     Q4BP checks  If BP severe (>160/110) page provider. If x2- per protocol, treat with IV Labetalol first.    May need to start Mag Sulfate infusion if multiple severe Bps..

## 2023-07-25 NOTE — H&P
[facility] Labor and Delivery History and Physical    Ximena Brewster MRN# [id number]   Age: 27 year old YOB: 1995     Date of Admission:  2023           HPI:   Ximena Brewster is a 27 year old  at 38w2d by LMP admitted for latent labor.  The patient was seen earlier yesterday evening with contractions and rule out labor and SROM and was found to be unchanged at 2.3 cm. She went home and this morning around 0200 had onset of increasing frequency and intensity of contractions and was found to be 6cm with BBOW.  Good fetal movement.  She is now comfortable with epidural.           Pregnancy history:     OBSTETRIC HISTORY:  OB History    Para Term  AB Living   1 0 0 0 0 0   SAB IAB Ectopic Multiple Live Births   0 0 0 0 0      # Outcome Date GA Lbr Case/2nd Weight Sex Delivery Anes PTL Lv   1 Current                EDC: Estimated Date of Delivery: Aug 6, 2023    Prenatal Labs:   Lab Results   Component Value Date    AS Negative 2023    HGB 12.0 2023       GBS Status:   Lab Results   Component Value Date    GBS Negative 2023          Maternal Past Medical History:     Past Medical History:   Diagnosis Date    Bulging lumbar disc     L5/S1    Class 3 obesity (H)     Depressive disorder     Takes prozac    Hyperemesis gravidarum     Preeclampsia, third trimester     Short cervix      Past Surgical History:   Procedure Laterality Date    TONSILLECTOMY  2002    WISDOM TOOTH EXTRACTION        Medications Prior to Admission   Medication Sig Dispense Refill Last Dose    senna-docusate (SENOKOT-S/PERICOLACE) 8.6-50 MG tablet Take 1 tablet by mouth daily   2023    acetaminophen (TYLENOL) 500 MG tablet Take 500-1,000 mg by mouth every 6 hours as needed for mild pain       aspirin (ASA) 81 MG chewable tablet Take 81 mg by mouth daily       cetirizine (ZYRTEC) 10 MG tablet Take 20 mg by mouth daily       FLUoxetine (PROZAC) 40 MG capsule Take 80 mg by  mouth daily       ondansetron (ZOFRAN) 8 MG tablet Take 8 mg by mouth every 8 hours as needed for nausea       Prenatal Vit-Fe Fumarate-FA (PRENATAL MULTIVITAMIN W/IRON) 27-0.8 MG tablet Take 1 tablet by mouth daily             Social History:     Social History     Tobacco Use    Smoking status: Never    Smokeless tobacco: Never   Substance Use Topics    Alcohol use: Not Currently            Review of Systems:   The Review of Systems is negative other than noted in the HPI          Physical Exam:   Patient Vitals for the past 8 hrs:   BP Temp Temp src SpO2   07/25/23 1107 -- 98  F (36.7  C) Oral --   07/25/23 0931 139/80 -- -- --   07/25/23 0900 (!) 142/79 -- -- --   07/25/23 0830 (!) 141/82 -- -- --   07/25/23 0748 134/75 -- -- --   07/25/23 0732 (!) 149/70 -- -- --   07/25/23 0703 (!) 146/66 -- -- --   07/25/23 0647 (!) 148/67 -- -- --   07/25/23 0633 (!) 140/67 -- -- --   07/25/23 0614 136/68 -- -- --   07/25/23 0609 (!) 142/72 -- -- --   07/25/23 0555 128/58 -- -- --   07/25/23 0550 138/77 -- -- --   07/25/23 0544 (!) 156/73 -- -- --   07/25/23 0539 (!) 170/74 -- -- --   07/25/23 0534 107/82 -- -- --   07/25/23 0527 (!) 167/76 -- -- --   07/25/23 0525 (!) 156/89 -- -- 96 %   07/25/23 0523 (!) 145/88 -- -- --   07/25/23 0521 (!) 151/100 -- -- --   07/25/23 0520 -- -- -- 96 %   07/25/23 0518 (!) 154/105 -- -- --   07/25/23 0515 -- -- -- 98 %   07/25/23 0510 -- -- -- 99 %   07/25/23 0505 -- -- -- 100 %   07/25/23 0503 -- -- -- (!) 81 %   07/25/23 0500 -- -- -- 98 %   07/25/23 0458 (!) 143/67 -- -- --   07/25/23 0455 -- -- -- 96 %   07/25/23 0451 -- -- -- (!) 74 %   07/25/23 0450 -- -- -- 100 %   07/25/23 0345 (!) 146/87 97.8  F (36.6  C) -- --   07/25/23 0340 (!) 170/87 -- -- --   07/25/23 0338 (!) 176/92 -- -- --     Gen: Pleasant, NAD   CV:  Regular rate and rhythm, no murmurs, rubs or gallops appreciated   Resp: Non-labored breathing.  Lungs clear to ausculation bilaterally   Abd: Obese, soft, non-tender and  non-distended   Ext: Trace pedal edema bilaterally     Cervix: 9/100%/0 with BBOW  Membranes: AROM, clear fluid at 1000  EFW: 8 lbs.  Presentation:Cephalic    Fetal Heart Rate Tracing:   Baseline 120  Variability: Moderate  Accelerations: Present  Decelerations: Variable decelerations  Interpretation: reactive    Contractions: q 1-4 min per EFM        Assessment:   Ximena Brewster is a 27 year old  at 38w2d admitted for latent labor.        Plan:     Labor:   - Patient with spontaneous labor from 6 cm on admission until 9 cm follow uncomplicated labor epidural at which time AROM was completed and attempt to place IUPC was made given inability to fully trace contractions and inability to titrate pitocin above 6 mu/min. She quickly progressed to complete cervical dilation.  Second stage of labor was then initiated.  See delivery note for further details.    Pain: Epidural with good relief     FWB:   - Continous EFM   - Category II FHT      Pre-E without SF:   - HELLP WNL, P:CR elevated at 0.39  - Nonsustained BPs that haven't required Mag sulfate yet, if >160/110 will meet criteria for pre-E with SF and would need Mag sulfate PPx through 24 hours PP   - Will start Procardia 30 mg now, will need titration PP and patient is aware  - Discharge no earlier than PPD#3  Prenatal Care:  - OB labs reviewed: A positive, Rubella immune, Heb B Ag non-reactive, HIV negative, RPR negative  - Genetics: NIPS normal, AFP normal  - Anatomy ultrasound: level 2 US incomplete recheck at 26 weeks unremarkable   - S/p flu, Tdap 23, s/p Covid vaccine and bivalent booster  - GBS neg  - Feed: breast    Class 3 obesity  - PPx ASA  - Growth US:  with MFM (growth EFW 63 AC 82)    MDD/DARLING  - Continue Prozac 80 mg daily  - HB involved, SW PP    Princess uJstice MD   Pager: [phone]   2023

## 2023-07-25 NOTE — PROVIDER NOTIFICATION
07/25/23 0730   Provider Notification   Provider Name/Title Dr. Justice   Method of Notification Phone   Request Evaluate - Remote   Notification Reason Status Update     Patient comfortable with epidural. Unable to monitor baby in side lying position. BOW intact. AROM and internal monitors requested as cervix was unchanged at 0619. BP's remotely reviewed. Plan per provider: attempt position change to facilitate picking up contractions. Place Faby if needed and start Pitocin if indicated.

## 2023-07-25 NOTE — DISCHARGE SUMMARY
[facility] Discharge Summary    Ximena Brewster MRN# [id number]   Age: 27 year old YOB: 1995     Date of Admission:  2023  Date of Discharge::  23  Admitting Physician:  Ania Castillo MD  Discharge Physician:  Ivory Waller MD          Admission Diagnoses:   IUP at 38w2d  Latent labor  GBS negative   MDD/DARLING on Prozac   Class 3 obesity             Discharge Diagnosis:     IUP at 38w2d, now delivered  Pre-E without Severe features  PPH secondary to uterine atony   ABLA 645mL          Procedures:     Procedure(s):   Epidural           Medications Prior to Admission:     Medications Prior to Admission   Medication Sig Dispense Refill Last Dose    senna-docusate (SENOKOT-S/PERICOLACE) 8.6-50 MG tablet Take 1 tablet by mouth daily   2023    acetaminophen (TYLENOL) 500 MG tablet Take 500-1,000 mg by mouth every 6 hours as needed for mild pain       aspirin (ASA) 81 MG chewable tablet Take 81 mg by mouth daily       cetirizine (ZYRTEC) 10 MG tablet Take 20 mg by mouth daily       FLUoxetine (PROZAC) 40 MG capsule Take 80 mg by mouth daily       ondansetron (ZOFRAN) 8 MG tablet Take 8 mg by mouth every 8 hours as needed for nausea       Prenatal Vit-Fe Fumarate-FA (PRENATAL MULTIVITAMIN W/IRON) 27-0.8 MG tablet Take 1 tablet by mouth daily                Discharge Medications:        Review of your medicines        UNREVIEWED medicines. Ask your doctor about these medicines        Dose / Directions   acetaminophen 500 MG tablet  Commonly known as: TYLENOL      Dose: 500-1,000 mg  Take 500-1,000 mg by mouth every 6 hours as needed for mild pain  Refills: 0     aspirin 81 MG chewable tablet  Commonly known as: ASA      Dose: 81 mg  Take 81 mg by mouth daily  Refills: 0     cetirizine 10 MG tablet  Commonly known as: zyrTEC      Dose: 20 mg  Take 20 mg by mouth daily  Refills: 0     FLUoxetine 40 MG capsule  Commonly known as: PROzac      Dose: 80 mg  Take 80 mg by mouth  daily  Refills: 0     ondansetron 8 MG tablet  Commonly known as: ZOFRAN      Dose: 8 mg  Take 8 mg by mouth every 8 hours as needed for nausea  Refills: 0     prenatal multivitamin w/iron 27-0.8 MG tablet      Dose: 1 tablet  Take 1 tablet by mouth daily  Refills: 0     senna-docusate 8.6-50 MG tablet  Commonly known as: SENOKOT-S/PERICOLACE      Dose: 1 tablet  Take 1 tablet by mouth daily  Refills: 0                     Consultations:   Social Work          Brief Admission History and Intrapartum Course:     Ximena Brewster is a 27 year old  who was admitted at 38w2d for contractions, found to be in latent labor.  Pregnancy was complicated by class 3 obesity, MDD/DARLING on Prozac and meeting criteria for pre-E without SF upon admission.  GBS carrier status was negative. Regarding her pre-E diagnosis, she did not require IV antihypertensives nor meet criteria for severe features. She underwent labor augmentation with AROM at 1000 on 2023 following uncomplicated labor epidural, notable for clear fluid.  At that time, she was 9 cm and quickly went to complete cervical dilation and began active second stage. She pushed effectively and delivered a viable female infant at 1036 with APGARs pending.  Spontaneously delivery of an intact placenta with a 3-V cord ensued shortly thereafter.  She received 30 units of IV pitocin as a uterotonic agent along with buccal misoprostol given uterine atony.  Exam of the perineum revealed a 2nd degree laceration which was repaired in standard fashion using a 3-0 Vicryl suture as well as a right labial laceration which was repaired using a 4-0 vicryl sutures in a running fashion.  QBL 645cc.  PM CBC/CMP ordered as well as close BP monitoring.  Her first dose of Procardia XL 30 mg was given prior to delivery.     Delivery Summary    Ximena Brewster MRN# [id number]   Age: 27 year old YOB: 1995          Ney Female-Ximena [[id number]]      Labor Event  Times      Active labor onset date: 23 Onset time:  3:50 AM CDT   Dilation complete date: 23 Complete time: 10:13 AM   Start pushing date/time: 2023 1015          Labor Length      1st Stage (hrs): 6 (min): 23   2nd Stage (hrs): 0 (min): 23   3rd Stage (hrs): 0 (min): 5          Labor Events     labor?: No   steroids: None  Labor Type: Spontaneous, AROM  Predominate monitoring during 1st stage: continuous electronic fetal monitoring     Antibiotics received during labor?: No       Rupture date/time: 23 1000   Rupture type: Artificial Rupture of Membranes  Fluid color: Clear     Augmentation: AROM, Oxytocin  Indications for augmentation: Preeclampsia, Ineffective Contraction Pattern       Delivery/Placenta Date and Time      Delivery Date: 23 Delivery Time: 10:36 AM   Placenta Date/Time: 2023 10:41 AM  Oxytocin given at the time of delivery: after delivery of placenta  Delivering clinician: Princess Lewis MD   Other personnel present at delivery:  Provider Role   Gracia Lou RN Anderson, Nicole, RN              Vaginal Counts       Initial count performed by 2 team members:  Two Team Members   Joshua Delgado         Needles Suture Needles Sponges (RETIRED) Instruments   Initial counts 2  5    Added to count  2 5    Relief counts       Final counts 2 2 10            Placed during labor Accounted for at the end of labor   FSE No NA   IUPC No NA   Cervidil No NA                  Final count performed by 2 team members:  Two Team Members   Joshua Delgado      Final count correct?: Yes  Pre-Birth Team Brief: Complete  Post-Birth Team Debrief: Complete       Apgars    Living status: Living   1 Minute 5 Minute 10 Minute 15 Minute 20 Minute   Skin color:        Heart rate:        Reflex irritability:        Muscle tone:        Respiratory effort:        Total:               Cord      Vessels: 3 Vessels    Cord Complications: None               Cord Blood  "Disposition: Lab    Gases Sent?: No    Delayed cord clamping?: Yes    Cord Clamping Delay (seconds): 31-60 seconds    Stem cell collection?: No            Measurements      Weight: 8 lb 4.3 oz Length: 1' 8\"     Head circumference: 13.7 cm           Skin to Skin and Feeding Plan      Skin to skin initiation date/time:       Skin to skin end date/time:     Breastfeeding initiated date/time: 2023 1110       Labor Events and Shoulder Dystocia    Fetal Tracing Prior to Delivery: Category 2  Shoulder dystocia present?: Neg       Delivery (Maternal) (Provider to Complete) ([id number])      Perineal lacerations: 2nd Repaired?: Yes     Labial laceration: right Repaired?: Yes   Repair suture: 3-0 Vicryl, 4-0 Vicryl  Genital tract inspection done: Pos       Blood Loss  Mother: Ximena Brewster #[id number]     Start of Mother's Information      Delivery Blood Loss  23 0350 - 23 1127      Delivery QBL (mL) Hospital Encounter 645 mL    Total  645 mL               End of Mother's Information  Mother: Ximena Brewster #[id number]                Delivery - Provider to Complete ([id number])    Delivering clinician: Princess Lewis MD  Delivery Type (Choose the 1 that will go to the Birth History): Vaginal, Spontaneous                         Other personnel:  Provider Role   Gracia Lou RN Anderson, Nicole, RN                     Placenta    Date/Time: 2023 10:41 AM  Removal: Expressed  Disposition: Hospital disposal             Anesthesia    Method: Epidural                    Presentation and Position    Presentation: Vertex    Position: Right Occiput Anterior                            Post-partum Course:   The patient's hospital course was notable for her pre-E without  severe features.  She was started on Procardia 30 mg every day prior to delivery and at the time of discharge, her blood pressure  appropriately controlled with Procardia 60/30.  On discharge, her pain was well " controlled. Vaginal bleeding is similar to peak menstrual flow.  Voiding without difficulty.  Ambulating well and tolerating a normal diet.  No fever.  Breastfeeding well.  Infant is stable.  She was discharged on post-partum day #3.    Post-partum hemoglobin: 9.6          Discharge Instructions and Follow-Up:     Discharge diet: Regular   Discharge activity: Pelvic rest for 6 weeks including no sexual intercourse, tampons, or douching.    Discharge follow-up: Follow up with your primary OB for a routine postpartum visit in 6 weeks           Discharge Disposition:     Discharged to home      Ivory Waller MD on 7/28/2023 at 3:58 PM

## 2023-07-25 NOTE — DISCHARGE INSTRUCTIONS
Discharge Instruction for Undelivered Patients      You were seen for: Membrane Assessment  We Consulted: Dr. Castillo  You had (Test or Medicine):Fern swab, cervical exam, external fetal monitoring     Diet:   Drink 8 to 12 glasses of liquids (milk, juice, water) every day.  You may eat meals and snacks.     Activity:  Count fetal kicks everyday (see handout)  Call your doctor or nurse midwife if your baby is moving less than usual.     Call your provider if you notice:  Swelling in your face or increased swelling in your hands or legs.  Headaches that are not relieved by Tylenol (acetaminophen).  Changes in your vision (blurring: seeing spots or stars.)  Nausea (sick to your stomach) and vomiting (throwing up).   Weight gain of 5 pounds or more per week.  Heartburn that doesn't go away.  Signs of bladder infection: pain when you urinate (use the toilet), need to go more often and more urgently.  The bag of brown (rupture of membranes) breaks, or you notice leaking in your underwear.  Bright red blood in your underwear.  Abdominal (lower belly) or stomach pain.  For first baby: Contractions (tightening) less than 5 minutes apart for one hour or more.  Increase or change in vaginal discharge (note the color and amount)      Follow-up:  As scheduled in the clinic

## 2023-07-25 NOTE — PROVIDER NOTIFICATION
07/24/23 2015   Provider Notification   Provider Name/Title Dr. Castillo   Method of Notification Phone   Request Evaluate - Remote   Notification Reason SVE;Lab/Diagnostic Study;Membrane Status     RN updated MD that fern resulted negative, speculum exam showed minimal vaginal fluid, SVE 2.5/70/-2, RN able to palpate an intact BOW. FHR tracing category 1, patient yancy Q4-8, ctx palpate mild.     Order received to discharge patient home. Patient and partner agreeable with POC.

## 2023-07-25 NOTE — PROVIDER NOTIFICATION
07/25/23 0818   Provider Notification   Provider Name/Title Dr. Justice   Method of Notification Phone   Request Evaluate - Remote   Notification Reason Uterine Activity;SVE     0809- SVE 7-8/90/0. Contractions per palpation and TOCO q 2-4. Dr. Justice updated per phone. Plan per provider: begin Pitocin.

## 2023-07-25 NOTE — PROVIDER NOTIFICATION
"   07/25/23 0626   Provider Notification   Provider Name/Title Dr. fox   Method of Notification Electronic Page     \"SVE unchanged. BOW. Ctx 1-3 min. FHR Cat 1. Comfortable with epidural.\"  "

## 2023-07-25 NOTE — ANESTHESIA PREPROCEDURE EVALUATION
Anesthesia Pre-Procedure Evaluation    Patient: Ximena Brewster   MRN: [id number] : 1995        Procedure :           Past Medical History:   Diagnosis Date    Bulging lumbar disc 2013    L5/S1    Depressive disorder     Takes prozac    Hyperemesis gravidarum     Short cervix       Past Surgical History:   Procedure Laterality Date    TONSILLECTOMY  2002    WISDOM TOOTH EXTRACTION        Allergies   Allergen Reactions    Bupropion      Vision changes    Omeprazole      Anxiety    Sulfa Antibiotics      Gas pain      Social History     Tobacco Use    Smoking status: Never    Smokeless tobacco: Never   Substance Use Topics    Alcohol use: Not Currently      Wt Readings from Last 1 Encounters:   No data found for Wt        Anesthesia Evaluation            ROS/MED HX  ENT/Pulmonary:  - neg pulmonary ROS     Neurologic:  - neg neurologic ROS     Cardiovascular:  - neg cardiovascular ROS     METS/Exercise Tolerance: >4 METS    Hematologic:  - neg hematologic  ROS     Musculoskeletal: Comment: HNP L5-S1 - neg musculoskeletal ROS     GI/Hepatic:       Renal/Genitourinary:  - neg Renal ROS     Endo:     (+)               Obesity,       Psychiatric/Substance Use:     (+) psychiatric history depression       Infectious Disease:  - neg infectious disease ROS     Malignancy:  - neg malignancy ROS     Other:  - neg other ROS          Physical Exam    Airway        Mallampati: II   TM distance: > 3 FB   Neck ROM: full   Mouth opening: > 3 cm    Respiratory Devices and Support         Dental           Cardiovascular             Pulmonary   pulmonary exam normal            Other findings: Lab Test        23                       0433          2142          WBC          10.6         13.0*         HGB          12.0         12.8          MCV          88           88            PLT          204          239            Lab Test        23                       0433          2142           NA           134*         134*          POTASSIUM    3.9          3.7           CHLORIDE     101          104           CO2          19*          19*           BUN          8.3          7.4           CR           0.62         0.58          ANIONGAP     14           11            ITALO          9.0          9.2           GLC          101*         109*              OUTSIDE LABS:  CBC:   Lab Results   Component Value Date    WBC 10.6 07/25/2023    WBC 13.0 (H) 07/13/2023    HGB 12.0 07/25/2023    HGB 12.8 07/13/2023    HCT 35.4 07/25/2023    HCT 37.7 07/13/2023     07/25/2023     07/13/2023     BMP:   Lab Results   Component Value Date     (L) 07/25/2023     (L) 07/13/2023    POTASSIUM 3.9 07/25/2023    POTASSIUM 3.7 07/13/2023    CHLORIDE 101 07/25/2023    CHLORIDE 104 07/13/2023    CO2 19 (L) 07/25/2023    CO2 19 (L) 07/13/2023    BUN 8.3 07/25/2023    BUN 7.4 07/13/2023    CR 0.62 07/25/2023    CR 0.58 07/13/2023     (H) 07/25/2023     (H) 07/13/2023     COAGS: No results found for: PTT, INR, FIBR  POC: No results found for: BGM, HCG, HCGS  HEPATIC:   Lab Results   Component Value Date    ALBUMIN 3.1 (L) 07/25/2023    PROTTOTAL 6.2 (L) 07/25/2023    ALT 8 07/25/2023    AST 17 07/25/2023    ALKPHOS 131 (H) 07/25/2023    BILITOTAL 0.4 07/25/2023     OTHER:   Lab Results   Component Value Date    ITALO 9.0 07/25/2023       Anesthesia Plan    ASA Status:  3       Anesthesia Type: Epidural.              Consents    Anesthesia Plan(s) and associated risks, benefits, and realistic alternatives discussed. Questions answered and patient/representative(s) expressed understanding.     - Discussed:     - Discussed with:  Patient            Postoperative Care       PONV prophylaxis: Ondansetron (or other 5HT-3)     Comments:                Hardeep Ibarra MD

## 2023-07-25 NOTE — L&D DELIVERY NOTE
Ximena Brewster is a 27 year old  who was admitted at 38w2d for contractions, found to be in latent labor.  Pregnancy was complicated by class 3 obesity, MDD/DARLING on Prozac and meeting criteria for pre-E without SF upon admission.  GBS carrier status was negative. Regarding her pre-E diagnosis, she did not require IV antihypertensives nor meet criteria for severe features. She underwent labor augmentation with AROM at 1000 on 2023 following uncomplicated labor epidural, notable for clear fluid.  At that time, she was 9 cm and quickly went to complete cervical dilation and began active second stage. She pushed effectively and delivered a viable female infant at 1036 with APGARs pending.  Spontaneously delivery of an intact placenta with a 3-V cord ensued shortly thereafter.  She received 30 units of IV pitocin as a uterotonic agent along with buccal misoprostol given uterine atony.  Exam of the perineum revealed a 2nd degree laceration which was repaired in standard fashion using a 3-0 Vicryl suture as well as a right labial laceration which was repaired using a 4-0 vicryl sutures in a running fashion.  QBL 645cc.  PM CBC/CMP ordered as well as close BP monitoring.  Her first dose of Procardia XL 30 mg was given prior to delivery.     Princess Justice MD   Pager: [phone]   2023, 11:23 AM     Delivery Summary    Ximena Brewster MRN# [id number]   Age: 27 year old YOB: 1995          Ney, Female-Ximena [[id number]]      Labor Event Times      Active labor onset date: 23 Onset time:  3:50 AM CDT   Dilation complete date: 23 Complete time: 10:13 AM   Start pushing date/time: 2023 1015          Labor Length      1st Stage (hrs): 6 (min): 23   2nd Stage (hrs): 0 (min): 23   3rd Stage (hrs): 0 (min): 5          Labor Events     labor?: No   steroids: None  Labor Type: Spontaneous, AROM  Predominate monitoring during 1st stage: continuous  "electronic fetal monitoring     Antibiotics received during labor?: No       Rupture date/time: 23 1000   Rupture type: Artificial Rupture of Membranes  Fluid color: Clear     Augmentation: AROM, Oxytocin  Indications for augmentation: Preeclampsia, Ineffective Contraction Pattern       Delivery/Placenta Date and Time      Delivery Date: 23 Delivery Time: 10:36 AM   Placenta Date/Time: 2023 10:41 AM  Oxytocin given at the time of delivery: after delivery of placenta  Delivering clinician: Princess Lewis MD   Other personnel present at delivery:  Provider Role   Gracia Lou RN Anderson, Nicole, RN              Vaginal Counts       Initial count performed by 2 team members:  Two Team Members   Joshua Delgado         Needles Suture Needles Sponges (RETIRED) Instruments   Initial counts 2  5    Added to count  2 5    Relief counts       Final counts 2 2 10            Placed during labor Accounted for at the end of labor   FSE No NA   IUPC No NA   Cervidil No NA                  Final count performed by 2 team members:  Two Team Members   Joshua Delgado      Final count correct?: Yes  Pre-Birth Team Brief: Complete  Post-Birth Team Debrief: Complete       Apgars    Living status: Living   1 Minute 5 Minute 10 Minute 15 Minute 20 Minute   Skin color:        Heart rate:        Reflex irritability:        Muscle tone:        Respiratory effort:        Total:               Cord      Vessels: 3 Vessels    Cord Complications: None               Cord Blood Disposition: Lab    Gases Sent?: No    Delayed cord clamping?: Yes    Cord Clamping Delay (seconds): 31-60 seconds    Stem cell collection?: No           [place] Measurements      Weight: 8 lb 4.3 oz Length: 1' 8\"     Head circumference: 13.7 cm           Skin to Skin and Feeding Plan      Skin to skin initiation date/time:       Skin to skin end date/time:     Breastfeeding initiated date/time: 2023 1110       Labor Events and " Shoulder Dystocia    Fetal Tracing Prior to Delivery: Category 2  Shoulder dystocia present?: Neg       Delivery (Maternal) (Provider to Complete) ([id number])      Perineal lacerations: 2nd Repaired?: Yes     Labial laceration: right Repaired?: Yes   Repair suture: 3-0 Vicryl, 4-0 Vicryl  Genital tract inspection done: Pos       Blood Loss  Mother: Ximena Brewster #[id number]     Start of Mother's Information      Delivery Blood Loss  07/25/23 0350 - 07/25/23 1127      Delivery QBL (mL) Hospital Encounter 645 mL    Total  645 mL               End of Mother's Information  Mother: Ximena Brewster #[id number]                Delivery - Provider to Complete ([id number])    Delivering clinician: Princess Lewis MD  Delivery Type (Choose the 1 that will go to the Birth History): Vaginal, Spontaneous                         Other personnel:  Provider Role   Gracia Lou RN    Danny, Elizabeth, ELVER                     Placenta    Date/Time: 7/25/2023 10:41 AM  Removal: Expressed  Disposition: Hospital disposal             Anesthesia    Method: Epidural                    Presentation and Position    Presentation: Vertex    Position: Right Occiput Anterior                     Princess Lewis MD

## 2023-07-25 NOTE — CARE PLAN
"Data: Patient presented to [facility]: 2023  6:39 PM.  Reason for maternal/fetal assessment is leaking vaginal fluid. Patient reports feeling a leak of \"extra fluid\" after voiding and wiping. She put in a pad and noticed \"a few small wet spots\" but did not feel any more large leaks. Upon assessment in MAC, pad appears dry, no noticeable wetness on perineum. Patient also reports pain in her low back and low belly \"like period cramps.\" Patient is a .  Prenatal record reviewed. Pregnancy has been complicated by hypermesis, short cervical length, complex social/mental health situation, [facility] following  Gestational Age 38w1d. VSS. Fetal movement present. Patient denies vaginal bleeding, abdominal pain, vomiting, headache, visual disturbances, epigastric or RUQ pain, significant edema. Support person is present.   Action: Verbal consent for EFM. Triage assessment completed. Bill of rights reviewed.  Response: Patient verbalized agreement with plan. Dr. Castillo in department; verbal order given to pool patient and collect fern swab, eldon SVE. RN will update MD with results.    "

## 2023-07-25 NOTE — ANESTHESIA PROCEDURE NOTES
Epidural catheter Procedure Note    Pre-Procedure   Staff -        Anesthesiologist:  Hardeep Ibarra MD       Performed By: anesthesiologist       Referred By: Jonathan       Location: OB       Pre-Anesthestic Checklist: patient identified, IV checked, risks and benefits discussed, informed consent, monitors and equipment checked, pre-op evaluation, at physician/surgeon's request and post-op pain management  Timeout:       Correct Patient: Yes        Correct Procedure: Yes        Correct Site: Yes        Correct Position: Yes   Procedure Documentation  Procedure: epidural catheter  Medication(s) Administered   0.25% Bupivacaine PF (Epidural) - EPIDURAL   10 mL - 7/25/2023 5:20:00 AM  2% Lidocaine w/ 1:200K Epi (EPIDURAL) - EPIDURAL   1 mL - 7/25/2023 5:18:00 AM   Comments:  Patient desires Labor Epidural for labor analgesia. Vaginal delivery anticipated.    Chart reviewed. Patient examined. No changes to pre procedure chart review. Risks including but not limited to bleeding, infection, nerve injury, PDPH, intrathecal injection, high block, incomplete block, one-sided block, back pain, and low blood pressure discussed in detail. Questions answered. Consent signed.    Pause for the Cause completed. NIBP and pulse ox functioning. L&D nurse present.    Procedure: Sitting. Betadine prep x 3. Sterile drape applied.  Lidocaine 1% x 2 cc local infiltration at L 3-4.  17 G. Tu needle ML MAXINE 1 attempt.  No CSF, paresthesia or blood. 20 g. Epidural catheter inserted w/o resistance 5 cm.  Negative aspiration for CSF and blood. Filter in line.  Test dose Lidocaine 1.5% w/ 1:200,000 epi x 3 cc injected. Negative for neuro change or symptoms of intravascular injection.  Bolus dose: Marcaine 0.25% 5cc x 2 doses (10 cc total).  Infusion orders written.    I or my partner am immediately available. I or my partner will monitor the patient and supervise nursing care at necessary intervals.    Kris      FOR [facility]  [facility]Bank) ONLY:   Pain Team Contact information: please page the Pain Team Via [facility]. Search \"Pain\". During daytime hours, please page the attending first. At night please page the resident first.      "

## 2023-07-26 LAB
ALBUMIN SERPL BCG-MCNC: 2.5 G/DL (ref 3.5–5.2)
ALP SERPL-CCNC: 108 U/L (ref 35–104)
ALT SERPL W P-5'-P-CCNC: 7 U/L (ref 0–50)
ANION GAP SERPL CALCULATED.3IONS-SCNC: 8 MMOL/L (ref 7–15)
AST SERPL W P-5'-P-CCNC: 16 U/L (ref 0–45)
BASOPHILS # BLD AUTO: 0 10E3/UL (ref 0–0.2)
BASOPHILS NFR BLD AUTO: 0 %
BILIRUB SERPL-MCNC: 0.2 MG/DL
BUN SERPL-MCNC: 9.3 MG/DL (ref 6–20)
CALCIUM SERPL-MCNC: 8.4 MG/DL (ref 8.6–10)
CHLORIDE SERPL-SCNC: 105 MMOL/L (ref 98–107)
CREAT SERPL-MCNC: 0.7 MG/DL (ref 0.51–0.95)
DEPRECATED HCO3 PLAS-SCNC: 25 MMOL/L (ref 22–29)
EOSINOPHIL # BLD AUTO: 0.1 10E3/UL (ref 0–0.7)
EOSINOPHIL NFR BLD AUTO: 1 %
ERYTHROCYTE [DISTWIDTH] IN BLOOD BY AUTOMATED COUNT: 13.6 % (ref 10–15)
GFR SERPL CREATININE-BSD FRML MDRD: >90 ML/MIN/1.73M2
GLUCOSE SERPL-MCNC: 86 MG/DL (ref 70–99)
HCT VFR BLD AUTO: 29.5 % (ref 35–47)
HGB BLD-MCNC: 9.6 G/DL (ref 11.7–15.7)
IMM GRANULOCYTES # BLD: 0.1 10E3/UL
IMM GRANULOCYTES NFR BLD: 1 %
LYMPHOCYTES # BLD AUTO: 3.3 10E3/UL (ref 0.8–5.3)
LYMPHOCYTES NFR BLD AUTO: 31 %
MCH RBC QN AUTO: 29.5 PG (ref 26.5–33)
MCHC RBC AUTO-ENTMCNC: 32.5 G/DL (ref 31.5–36.5)
MCV RBC AUTO: 91 FL (ref 78–100)
MONOCYTES # BLD AUTO: 0.6 10E3/UL (ref 0–1.3)
MONOCYTES NFR BLD AUTO: 6 %
NEUTROPHILS # BLD AUTO: 6.4 10E3/UL (ref 1.6–8.3)
NEUTROPHILS NFR BLD AUTO: 61 %
NRBC # BLD AUTO: 0 10E3/UL
NRBC BLD AUTO-RTO: 0 /100
PLATELET # BLD AUTO: 151 10E3/UL (ref 150–450)
POTASSIUM SERPL-SCNC: 4.1 MMOL/L (ref 3.4–5.3)
PROT SERPL-MCNC: 4.9 G/DL (ref 6.4–8.3)
RBC # BLD AUTO: 3.25 10E6/UL (ref 3.8–5.2)
SODIUM SERPL-SCNC: 138 MMOL/L (ref 136–145)
WBC # BLD AUTO: 10.5 10E3/UL (ref 4–11)

## 2023-07-26 PROCEDURE — 250N000013 HC RX MED GY IP 250 OP 250 PS 637: Performed by: OBSTETRICS & GYNECOLOGY

## 2023-07-26 PROCEDURE — 120N000001 HC R&B MED SURG/OB

## 2023-07-26 PROCEDURE — 250N000011 HC RX IP 250 OP 636: Mod: JZ | Performed by: OBSTETRICS & GYNECOLOGY

## 2023-07-26 PROCEDURE — 85025 COMPLETE CBC W/AUTO DIFF WBC: CPT | Performed by: OBSTETRICS & GYNECOLOGY

## 2023-07-26 PROCEDURE — 80053 COMPREHEN METABOLIC PANEL: CPT | Performed by: OBSTETRICS & GYNECOLOGY

## 2023-07-26 PROCEDURE — 36415 COLL VENOUS BLD VENIPUNCTURE: CPT | Performed by: OBSTETRICS & GYNECOLOGY

## 2023-07-26 RX ADMIN — FLUOXETINE 80 MG: 20 CAPSULE ORAL at 21:33

## 2023-07-26 RX ADMIN — ACETAMINOPHEN 650 MG: 325 TABLET, FILM COATED ORAL at 19:51

## 2023-07-26 RX ADMIN — IBUPROFEN 800 MG: 800 TABLET ORAL at 10:54

## 2023-07-26 RX ADMIN — ACETAMINOPHEN 650 MG: 325 TABLET, FILM COATED ORAL at 03:23

## 2023-07-26 RX ADMIN — IBUPROFEN 800 MG: 800 TABLET ORAL at 23:41

## 2023-07-26 RX ADMIN — ACETAMINOPHEN 650 MG: 325 TABLET, FILM COATED ORAL at 07:41

## 2023-07-26 RX ADMIN — ACETAMINOPHEN 650 MG: 325 TABLET, FILM COATED ORAL at 11:55

## 2023-07-26 RX ADMIN — DOCUSATE SODIUM 100 MG: 100 CAPSULE, LIQUID FILLED ORAL at 08:30

## 2023-07-26 RX ADMIN — ACETAMINOPHEN 650 MG: 325 TABLET, FILM COATED ORAL at 16:05

## 2023-07-26 RX ADMIN — ENOXAPARIN SODIUM 40 MG: 40 INJECTION SUBCUTANEOUS at 21:33

## 2023-07-26 RX ADMIN — NIFEDIPINE 60 MG: 30 TABLET, FILM COATED, EXTENDED RELEASE ORAL at 08:30

## 2023-07-26 RX ADMIN — BENZOCAINE: 11.4 AEROSOL, SPRAY TOPICAL at 21:30

## 2023-07-26 RX ADMIN — IBUPROFEN 800 MG: 800 TABLET ORAL at 17:28

## 2023-07-26 RX ADMIN — CETIRIZINE HYDROCHLORIDE 20 MG: 10 TABLET, FILM COATED ORAL at 21:33

## 2023-07-26 RX ADMIN — IBUPROFEN 800 MG: 800 TABLET ORAL at 03:23

## 2023-07-26 ASSESSMENT — ACTIVITIES OF DAILY LIVING (ADL)
ADLS_ACUITY_SCORE: 18

## 2023-07-26 NOTE — LACTATION NOTE
This note was copied from a baby's chart.  Lactation visit; this is Ximena's first baby.  Ximena reports infant has latched but sleepy and does not sustain latch.  Discussed potential first 24 hour feeding behaviors and early feeding cues.  Educated on benefits of STS and hand expression.  Infant currently STS and sleeping.  Infant brought to right breast in football hold and infant attempting latch; after several attempts infant was able to latch but unable to stimulate to suck and fell asleep.  Reviewed latch and positioning such as nose to nipple.  Demonstrated hand expression and large drops of colostrum noted.  Writer assisted with spoon feeding drops to infant- encouraged mother to continue hand expression.  All questions answered and encouraged to call for continued lactation support.  Bedside RN updated.     Update 2000.  Infant unable to grasp nipple and latch; 20 mm shield utilized and infant latches with narrow latch and unable to stimulate to suck.  With gloved finger writer did tongue exercises and reviewed with parents- encouraged to do prior to breast feed as infant with uncoordinated suck and holds tongue back and up.  Verbal consent given for DM.  Writer assisted with SNS at breast, infant had 1-2 minutes of active sucks but then appeared fussy and pulling away from breast.  Writer then finger fed rest of DM to infant- tolerated well.  Encouraged Ximena to hand express with each breastfeed. Bedside RN updated.

## 2023-07-26 NOTE — PROGRESS NOTES
[facility] Postpartum Note    S:  Ximena Brewster feels good this morning. Was able to sleep last night. Pain control adequate. Lochia minimal. Voiding. Breast feeding and supplementing with donor milk and is working with lactation.  Mood Good.     O:  Vitals were reviewed  BP: 131/79 Systolic (24hrs), Av , Min:125 , Max:146  Diastolic (24hrs), Av, Min:59, Max:86     General: healthy, alert, and no distress  Abd: soft, appropriately tender, fundus firm  Legs: Non-tender, 1+ pitting edema    No results found for: RH  Rubella: immune     Assessment and Plan:   Postpartum Day #1, status post vaginal delivery, doing well.    -- Pre-E without SF   -labs this AM were normal, repeat tomorrow AM   -procardia 60mg XR daily and titrate as needed   -discharge no early than ppd #3 (she would like to go home possible on evening of PPD #2 and we reviewed that if her BP was >150/100 in the last 24 hours she would need to stay. We also discussed the reason for staying until PPD #3 and risk of readmission if her BP spikes at home.  We also reviewed the need for close follow up in the office next week)  --obesity   -PPX Lovenox while inpatient  -- MDD/DARLING   -continue prozac 80mg Qday    -SW consult before discharge   --ABLA   -hgb 12.0 >  > hgb 9.6    -PO iron on discharge     -- Routine care  -- Contraception: undecided       Peyton Blackwell, DO, DO

## 2023-07-26 NOTE — PLAN OF CARE
Goal Outcome Evaluation:      Plan of Care Reviewed With: patient    Overall Patient Progress: improving    Data: Blood pressures slightly elevated this evening in mild range, extra dose of procardia given. Pt complained of slight headache, but no other pre-e symptoms. Labs drawn. All other vital signs within normal limits. Postpartum checks within normal limits - see flow record. Patient eating and drinking normally. Patient able to empty bladder independently and has been up to the bathroom one time. Patient performing self cares, is able to care for infant and is attempting to breastfeed every 2 hours.Using ice for perineal discomfort.   Action: Patient medicated with ibuprofen and tylenol during the shift for pain. See MAR. Adequate pain control noted by patient. Patient education done, see flow record.  Response: Positive attachment behaviors observed with infant. Patient's spouse present this shift.   Plan: Continue current plan of care. Pt informed that she will need to stay for 3 days due to blood pressures. Will continue to monitor and treat.

## 2023-07-26 NOTE — ANESTHESIA POSTPROCEDURE EVALUATION
Patient: Ximena Brewster    Procedure: * No procedures listed *       Anesthesia Type:  Epidural    Note:  Disposition: Inpatient   Postop Pain Control:    PONV:    Neuro/Psych:    Airway/Respiratory:    CV/Hemodynamics:    Other NRE:    DID A NON-ROUTINE EVENT OCCUR?     Event details/Postop Comments:  S/P Labor Epidural also used as primary anesthetic for C Section.  Doing well. VSS Temp normal. Satisfactory respiratory and cardiovascular function. Adequate pain control. Neuro at baseline. Denies positional headache. Minimal side effects easily managed w/ PRN meds. No apparent anesthetic complications. No follow-up required.  [email or web address]           Last vitals:  Vitals:    07/25/23 1737 07/25/23 2150 07/26/23 0300   BP: (!) 142/77 130/59 125/73   Pulse: 92 77 74   Resp: 18 16 18   Temp: 98.3  F (36.8  C) 98  F (36.7  C) 98.1  F (36.7  C)   SpO2:          Electronically Signed By: Hardeep Ibarra MD  July 26, 2023  7:18 AM

## 2023-07-26 NOTE — PLAN OF CARE
Data: Vital signs within normal limits. Postpartum checks within normal limits - see flow record. Patient eating and drinking normally. Patient able to empty bladder independently and is up ambulating. No apparent signs of infection. Patient performing self cares, is able to care for infant and is attempting to breastfeeding every 2-3 hours then following up with some donor milk.   Laceration  appears within normal. Is using Tylenol and Ibuprofen for mild discomfort.   Action: Patient medicated during the shift for pain and cramping. See MAR. Patient education done, see flow record.  Response: Positive attachment behaviors observed with infant. Patient's spouse present this shift.   Plan: Continue current plan of care.  Anticipate discharge on 7/27.

## 2023-07-26 NOTE — PLAN OF CARE
Goal Outcome Evaluation:      Plan of Care Reviewed With: patient    Overall Patient Progress: improving     Data: Vital signs within normal limits. Reflexes baseline, no clonus, no pre-e symptoms. Postpartum checks within normal limits - see flow record. Patient eating and drinking normally. Patient able to empty bladder independently and is up ambulating. Patient performing self cares, is able to care for infant and is attempting to breastfeed every 2-3 hours and supplementing with donor milk afterwards. Pt began pumping today as well.   Action: Patient medicated with ibuprofen and tylenol during the shift for pain. See MAR. Adequate pain control noted by patient. Patient education done, see flow record.  Response: Positive attachment behaviors observed with infant. Patient's spouse present this shift.   Plan: Continue current plan of care.  Anticipate discharge on 7/28/23. Will continue to monitor and treat.

## 2023-07-26 NOTE — LACTATION NOTE
"This note was copied from a baby's chart.  Lactation visit; Ximena reports infant not latching at breast and occasionally finger feeding with DM.  Writer in to assist with latch.  With gloved finger writer did tongue exercises and infant with uncoordinated (chompy) suck.  Brought STS on right breast in football hold, infant fussy at breast.  After few attempts infant latched with shield but did have narrow latch.  Attempted SNS at breast but infant refused to suck.  Ximena requesting to use bottle.  Writer assisted with paced bottle feeding- infant continued to have uncoordinated suck and needed pacing with bottle- did have some gagging.  Writer requested OT consult from Shirley ARTHUR and verbal order received for OT consult.    Writer assisted with pump set up and reviewed care/cleaning of parts and pump settings.  Encouraged to attempt breastfeeding prior to bottle so infant can \"practice\" latch.  Discussed benefits of hands on pumping techniques and hand expression.  All questions answered.  Ximena aware to call for continued lactation support as needed. Bedside RN updated.   "

## 2023-07-27 LAB
ALBUMIN SERPL BCG-MCNC: 2.9 G/DL (ref 3.5–5.2)
ALP SERPL-CCNC: 102 U/L (ref 35–104)
ALT SERPL W P-5'-P-CCNC: 8 U/L (ref 0–50)
ANION GAP SERPL CALCULATED.3IONS-SCNC: 9 MMOL/L (ref 7–15)
AST SERPL W P-5'-P-CCNC: 16 U/L (ref 0–45)
BILIRUB SERPL-MCNC: 0.2 MG/DL
BUN SERPL-MCNC: 13.5 MG/DL (ref 6–20)
CALCIUM SERPL-MCNC: 8.6 MG/DL (ref 8.6–10)
CHLORIDE SERPL-SCNC: 103 MMOL/L (ref 98–107)
CREAT SERPL-MCNC: 0.77 MG/DL (ref 0.51–0.95)
DEPRECATED HCO3 PLAS-SCNC: 24 MMOL/L (ref 22–29)
GFR SERPL CREATININE-BSD FRML MDRD: >90 ML/MIN/1.73M2
GLUCOSE SERPL-MCNC: 79 MG/DL (ref 70–99)
HOLD SPECIMEN: NORMAL
POTASSIUM SERPL-SCNC: 4.1 MMOL/L (ref 3.4–5.3)
PROT SERPL-MCNC: 5.3 G/DL (ref 6.4–8.3)
SODIUM SERPL-SCNC: 136 MMOL/L (ref 136–145)

## 2023-07-27 PROCEDURE — 250N000011 HC RX IP 250 OP 636: Mod: JZ | Performed by: OBSTETRICS & GYNECOLOGY

## 2023-07-27 PROCEDURE — 250N000013 HC RX MED GY IP 250 OP 250 PS 637: Performed by: OBSTETRICS & GYNECOLOGY

## 2023-07-27 PROCEDURE — 36415 COLL VENOUS BLD VENIPUNCTURE: CPT | Performed by: OBSTETRICS & GYNECOLOGY

## 2023-07-27 PROCEDURE — 80053 COMPREHEN METABOLIC PANEL: CPT | Performed by: OBSTETRICS & GYNECOLOGY

## 2023-07-27 PROCEDURE — 999N000079 HC STATISTIC IP LACTATION SERVICES 1-15 MIN

## 2023-07-27 PROCEDURE — 120N000001 HC R&B MED SURG/OB

## 2023-07-27 RX ORDER — ENOXAPARIN SODIUM 100 MG/ML
40 INJECTION SUBCUTANEOUS EVERY 12 HOURS
Status: DISCONTINUED | OUTPATIENT
Start: 2023-07-27 | End: 2023-07-28 | Stop reason: HOSPADM

## 2023-07-27 RX ADMIN — CETIRIZINE HYDROCHLORIDE 20 MG: 10 TABLET, FILM COATED ORAL at 20:46

## 2023-07-27 RX ADMIN — ENOXAPARIN SODIUM 40 MG: 40 INJECTION SUBCUTANEOUS at 12:00

## 2023-07-27 RX ADMIN — IBUPROFEN 800 MG: 800 TABLET ORAL at 19:37

## 2023-07-27 RX ADMIN — ACETAMINOPHEN 650 MG: 325 TABLET, FILM COATED ORAL at 07:53

## 2023-07-27 RX ADMIN — DOCUSATE SODIUM 100 MG: 100 CAPSULE, LIQUID FILLED ORAL at 08:53

## 2023-07-27 RX ADMIN — ACETAMINOPHEN 650 MG: 325 TABLET, FILM COATED ORAL at 11:49

## 2023-07-27 RX ADMIN — IBUPROFEN 800 MG: 800 TABLET ORAL at 06:12

## 2023-07-27 RX ADMIN — ACETAMINOPHEN 650 MG: 325 TABLET, FILM COATED ORAL at 20:52

## 2023-07-27 RX ADMIN — NIFEDIPINE 60 MG: 30 TABLET, FILM COATED, EXTENDED RELEASE ORAL at 08:53

## 2023-07-27 RX ADMIN — IBUPROFEN 800 MG: 800 TABLET ORAL at 13:36

## 2023-07-27 RX ADMIN — ACETAMINOPHEN 650 MG: 325 TABLET, FILM COATED ORAL at 16:19

## 2023-07-27 RX ADMIN — ACETAMINOPHEN 650 MG: 325 TABLET, FILM COATED ORAL at 03:01

## 2023-07-27 RX ADMIN — FLUOXETINE 80 MG: 20 CAPSULE ORAL at 20:45

## 2023-07-27 ASSESSMENT — ACTIVITIES OF DAILY LIVING (ADL)
ADLS_ACUITY_SCORE: 18

## 2023-07-27 NOTE — CONSULTS
D) SWS responding to automatic referral r/t MOB having a diagnosis of anxiety & depression.   I) SWS met with MOB, Ximena & Oscar WATSON who is supportive & involved. They live together in a private home & Alice is their first child together. They are prepared for baby at home and have all needed baby supplies.  Ximena confirmed she has a diagnosis of anxiety & depression. She voiced she is on Prozac & has an appointment with her psychiatrist on Monday to discuss postpartum depression further. Ximena shared she is doing ok at this time & denied any needs. SWS discussed baby blues/postpartum depression and gave information on this. SWS also gave Parent Resource Guide with SWS contact information.   A) Ximena is A&O with good affect and eye contact. She is bonding well with baby. Extended family is supportive & involved.   P) No further d/c needs at this time. SWS available upon request.  Li Lai, [facility]  7/27/2023  10:06 AM

## 2023-07-27 NOTE — LACTATION NOTE
Writer in to see patient. Ximena not wanting to put baby to breast. Is ok with pumping and bottle feeding at this point due to sleepy baby. Encouraged to pump every 3 hours with hand expression. Gave patient qr code for patient to watch. Observed a feed with Christa bottle. Baby bottle feeding well. Seen by ot today.

## 2023-07-27 NOTE — PROGRESS NOTES
Assessment was not done at 1830 because patient was on the phone and asked the nurse to come back in 20-30 minutes. Next shift nurse notified.

## 2023-07-27 NOTE — PROGRESS NOTES
[facility]  PPD# 2    Pt doing well, states she would like to go home this evening if her BPs are well controlled and deemed safe to go home, otherwise she is ok by staying as needed in order to have proper management of her BPs. Ambulating, voiding, tolerating PO. Decreased lochia. Pain controlled. Breast feeding and coping well with infant.    Vitals:    23 1931 23 2337 23 0300 23 0624   BP: 135/74 136/76 (!) 140/76    BP Location: Left arm      Patient Position: Sitting      Cuff Size: Adult Large      Pulse:  82 75    Resp: 18      Temp: 98.1  F (36.7  C) 98.1  F (36.7  C)     TempSrc: Oral Oral     SpO2:       Weight:    129.5 kg (285 lb 7.9 oz)     Abd soft, appropriately tender, nondistended, FFBU, no fundal tenderness  Ext 1+ edema bilat LE, no CT BL    Lab Results   Component Value Date    HGB 9.6 2023      Latest Reference Range & Units 23 21:42 23 04:33 23 18:17 23 06:36 23 06:50   Creatinine 0.51 - 0.95 mg/dL 0.58 0.62 0.60 0.70 0.77      Latest Reference Range & Units 23 21:42 23 04:33 23 18:17 23 06:36 23 06:50   ALT 0 - 50 U/L 8 8 7 7 8   AST 0 - 45 U/L 18 17 19 16 16      Latest Reference Range & Units 23 21:42 23 04:33 23 18:17 23 06:36   Platelet Count 150 - 450 10e3/uL 239 204 170 151       A/P 27 year old  at 38w2d s/p  PPD# 2.     --- Pre-E without SF              -labs this AM were normal, repeat this AM WNL              -procardia 60mg XR daily and titrate as needed              -BPs q 2 hrs while awake will eval this evening if ok to go home  --obesity              -PPX Lovenox while inpatient  -- MDD/DARLING              -continue prozac 80mg Qday               -SW consult before discharge   --ABLA              -hgb 12.0 >  > hgb 9.6               -PO iron on discharge      -- Routine care  -- Contraception: undecided, will discuss further at 6 wk  PP      Plan: will consider discharge this evening if BPs are mostly wnl. Otherwise anticipate discharge once BPs are well controlled.     Dr. Annamaria Sandhu  [phone]  7/27/2023 7:29 AM

## 2023-07-27 NOTE — PROGRESS NOTES
Public Health Nurse (PHN) spoke with patient regarding [facility] services and resources.  Patient was provided the [facility] Community Resource Guide and Public Health rack cards. Patient is aware of how add baby to insurance and has a primary clinic arranged for baby.  Patient accepted referral for home visiting services.  Ramaen Warning given, referral completed electronically.  Patient reports no questions or concerns at this time.

## 2023-07-27 NOTE — PLAN OF CARE
Goal Outcome Evaluation:      Plan of Care Reviewed With: patient    Overall Patient Progress: improving     Data: Blood pressures slightly elevated into the mild range this morning. No pre-e symptoms, reflexes baseline, no clonus. All other vital signs within normal limits. Postpartum checks within normal limits - see flow record. Patient eating and drinking normally. Patient able to empty bladder independently and is up ambulating. Patient performing self cares, is able to care for infant and is pumping every 2-3 hours and feeding infant with donor milk.   Action: Patient medicated with ibuprofen and tylenol during the shift for pain. See MAR. Adequate pain control noted by patient. Patient education done, see flow record.  Response: Positive attachment behaviors observed with infant. Patient's spouse present this shift.   Plan: Continue current plan of care.  Anticipate discharge on 7/27 or 7/28 morning depending on blood pressures. Will continue to monitor and treat.

## 2023-07-27 NOTE — PLAN OF CARE
VSS.  Denies s/s of PreE.  Refluxes WNL. Pain well controlled. Fundal checks and bleeding WNL. Voiding without difficulty, frequent voiding encouraged.  Patient is pumping and did not put  to breast.  She stated that she was ok with the feeding plan of pumping and bottle feeding with EBM.  FOB present overnight and is supportive.

## 2023-07-27 NOTE — PROVIDER NOTIFICATION
07/27/23 1458   Provider Notification   Provider Name/Title Dr Sandhu   Method of Notification Electronic Page   Request Evaluate-Remote   Notification Reason Other     Pt wondering about discharge this evening. Could you look at her blood pressures for the day when you get a chance? Thanks!

## 2023-07-28 VITALS
RESPIRATION RATE: 16 BRPM | DIASTOLIC BLOOD PRESSURE: 70 MMHG | BODY MASS INDEX: 42.37 KG/M2 | SYSTOLIC BLOOD PRESSURE: 145 MMHG | OXYGEN SATURATION: 96 % | HEIGHT: 69 IN | WEIGHT: 286.1 LBS | TEMPERATURE: 98 F | HEART RATE: 87 BPM

## 2023-07-28 PROBLEM — O14.90 PREECLAMPSIA: Status: ACTIVE | Noted: 2023-07-28

## 2023-07-28 LAB
ALBUMIN SERPL BCG-MCNC: 3 G/DL (ref 3.5–5.2)
ALP SERPL-CCNC: 104 U/L (ref 35–104)
ALT SERPL W P-5'-P-CCNC: 14 U/L (ref 0–50)
ANION GAP SERPL CALCULATED.3IONS-SCNC: 10 MMOL/L (ref 7–15)
AST SERPL W P-5'-P-CCNC: 28 U/L (ref 0–45)
BILIRUB SERPL-MCNC: 0.2 MG/DL
BUN SERPL-MCNC: 11.2 MG/DL (ref 6–20)
CALCIUM SERPL-MCNC: 8.9 MG/DL (ref 8.6–10)
CHLORIDE SERPL-SCNC: 100 MMOL/L (ref 98–107)
CREAT SERPL-MCNC: 0.65 MG/DL (ref 0.51–0.95)
DEPRECATED HCO3 PLAS-SCNC: 24 MMOL/L (ref 22–29)
GFR SERPL CREATININE-BSD FRML MDRD: >90 ML/MIN/1.73M2
GLUCOSE SERPL-MCNC: 72 MG/DL (ref 70–99)
PLATELET # BLD AUTO: 218 10E3/UL (ref 150–450)
POTASSIUM SERPL-SCNC: 3.8 MMOL/L (ref 3.4–5.3)
PROT SERPL-MCNC: 5.9 G/DL (ref 6.4–8.3)
SODIUM SERPL-SCNC: 134 MMOL/L (ref 136–145)

## 2023-07-28 PROCEDURE — 250N000013 HC RX MED GY IP 250 OP 250 PS 637: Performed by: OBSTETRICS & GYNECOLOGY

## 2023-07-28 PROCEDURE — 80053 COMPREHEN METABOLIC PANEL: CPT | Performed by: OBSTETRICS & GYNECOLOGY

## 2023-07-28 PROCEDURE — 36415 COLL VENOUS BLD VENIPUNCTURE: CPT | Performed by: OBSTETRICS & GYNECOLOGY

## 2023-07-28 PROCEDURE — 250N000011 HC RX IP 250 OP 636: Mod: JZ | Performed by: OBSTETRICS & GYNECOLOGY

## 2023-07-28 PROCEDURE — 85049 AUTOMATED PLATELET COUNT: CPT | Performed by: OBSTETRICS & GYNECOLOGY

## 2023-07-28 RX ORDER — MODIFIED LANOLIN
OINTMENT (GRAM) TOPICAL
Qty: 7 G | Refills: 3 | Status: SHIPPED | OUTPATIENT
Start: 2023-07-28

## 2023-07-28 RX ORDER — IBUPROFEN 800 MG/1
800 TABLET, FILM COATED ORAL EVERY 6 HOURS PRN
Qty: 40 TABLET | Refills: 1 | Status: SHIPPED | OUTPATIENT
Start: 2023-07-28

## 2023-07-28 RX ORDER — NIFEDIPINE 30 MG
30 TABLET, EXTENDED RELEASE ORAL AT BEDTIME
Qty: 60 TABLET | Refills: 1 | Status: SHIPPED | OUTPATIENT
Start: 2023-07-28

## 2023-07-28 RX ORDER — HYDROCORTISONE 25 MG/G
CREAM TOPICAL 3 TIMES DAILY PRN
Qty: 30 G | Refills: 0 | Status: SHIPPED | OUTPATIENT
Start: 2023-07-28 | End: 2023-08-01

## 2023-07-28 RX ORDER — DOCUSATE SODIUM 100 MG/1
100 CAPSULE, LIQUID FILLED ORAL DAILY
Qty: 30 CAPSULE | Refills: 0 | Status: SHIPPED | OUTPATIENT
Start: 2023-07-29

## 2023-07-28 RX ORDER — FUROSEMIDE 20 MG
20 TABLET ORAL DAILY
Status: DISCONTINUED | OUTPATIENT
Start: 2023-07-28 | End: 2023-07-28 | Stop reason: HOSPADM

## 2023-07-28 RX ORDER — NIFEDIPINE 30 MG/1
30 TABLET, EXTENDED RELEASE ORAL AT BEDTIME
Status: DISCONTINUED | OUTPATIENT
Start: 2023-07-28 | End: 2023-07-28 | Stop reason: HOSPADM

## 2023-07-28 RX ADMIN — ACETAMINOPHEN 650 MG: 325 TABLET, FILM COATED ORAL at 09:25

## 2023-07-28 RX ADMIN — ENOXAPARIN SODIUM 40 MG: 40 INJECTION SUBCUTANEOUS at 00:25

## 2023-07-28 RX ADMIN — NIFEDIPINE 60 MG: 30 TABLET, FILM COATED, EXTENDED RELEASE ORAL at 09:19

## 2023-07-28 RX ADMIN — ACETAMINOPHEN 650 MG: 325 TABLET, FILM COATED ORAL at 14:30

## 2023-07-28 RX ADMIN — FUROSEMIDE 20 MG: 20 TABLET ORAL at 12:47

## 2023-07-28 RX ADMIN — ENOXAPARIN SODIUM 40 MG: 40 INJECTION SUBCUTANEOUS at 12:47

## 2023-07-28 RX ADMIN — ACETAMINOPHEN 650 MG: 325 TABLET, FILM COATED ORAL at 00:25

## 2023-07-28 RX ADMIN — DOCUSATE SODIUM 100 MG: 100 CAPSULE, LIQUID FILLED ORAL at 09:19

## 2023-07-28 RX ADMIN — IBUPROFEN 800 MG: 800 TABLET ORAL at 15:45

## 2023-07-28 RX ADMIN — IBUPROFEN 800 MG: 800 TABLET ORAL at 03:40

## 2023-07-28 RX ADMIN — IBUPROFEN 800 MG: 800 TABLET ORAL at 09:25

## 2023-07-28 RX ADMIN — ACETAMINOPHEN 650 MG: 325 TABLET, FILM COATED ORAL at 05:54

## 2023-07-28 ASSESSMENT — ACTIVITIES OF DAILY LIVING (ADL)
ADLS_ACUITY_SCORE: 18

## 2023-07-28 NOTE — PLAN OF CARE
PT has mildly elevated BP's monitored Q 2 hrs while awake, denied PIH s/s; got Lasix 20 mg in the afternoon, had one larger output of 550 ml after that; pain controlled with ibuprofen and tylenol, continue to monitor BP and update Dr Johnson in the evening.

## 2023-07-28 NOTE — PROVIDER NOTIFICATION
07/28/23 1554   Provider Notification   Provider Name/Title Dr. Waller   Method of Notification Phone   Notification Reason Status Update  (Bp 127/76)     MD updated with latest BP and will put discharge orders.

## 2023-07-28 NOTE — PROGRESS NOTES
"[facility] Postpartum Note    S:  Ximena Brewster feels well this morning. Was able to sleep last night. Pain control adequate. Lochia minimal. Voiding. Breastfeeding. Mood Good.     O:  Vitals were reviewed  Blood pressure (!) 150/88, pulse 91, temperature 97.8  F (36.6  C), temperature source Oral, resp. rate 18, height 1.753 m (5' 9\"), weight 129.8 kg (286 lb 1.6 oz), last menstrual period 10/30/2022, SpO2 96 %, unknown if currently breastfeeding.    General: healthy, alert, and no distress  Abd: soft, appropriately tender, fundus firm  Legs: Non-tender, 1+ pitting edema    No results found for: RH  Rubella: immune    Assessment and Plan:   Postpartum Day #3, status post vaginal delivery, doing well.  -- Routine care  -- F/U 6 weeks w/ Primary OB  -- Discharge meds: see med rec  -- Contraception: undecided    --- Pre-E without SF              -labs this AM were normal, repeat this AM WNL              -procardia 60mg XR daily, increase to 60/30 7/28, lasix x1              -BPs q 2 hrs while awake will eval this evening if ok to go home  --obesity              -PPX Lovenox while inpatient  -- MDD/DARLING              -continue prozac 80mg Qday               -SW consult before discharge   --ABLA              -hgb 12.0 >  > hgb 9.6               -PO iron on discharge      -- Routine care  -- Contraception: undecided, will discuss further at 6 wk PP     Ivory Waller MD    "

## 2023-07-28 NOTE — PLAN OF CARE
Goal Outcome Evaluation:      Plan of Care Reviewed With: patient    Overall Patient Progress: improvingOverall Patient Progress: improving     VSS, except milkdy elevated BP. Up independent in room, voiding without difficultly. Pain managed with tylenol and ibuprofen. Bottle feeding infant. Bonding well with baby. FOB supportive and involved in cares.

## 2023-07-28 NOTE — DISCHARGE INSTRUCTIONS
Postpartum Vaginal Delivery Instructions    Activity     Ask family and friends for help when you need it.  Do not place anything in your vagina for 6 weeks.  You are not restricted on other activities, but take it easy for a few weeks to allow your body to recover from delivery.  You are able to do any activities you feel up to that point.  No driving until you have stopped taking your pain medications (usually two weeks after delivery).     Call your health care provider if you have any of these symptoms:     Increased pain, swelling, redness, or fluid around your stiches from an episiotomy or perineal tear.  A fever above 100.4 F (38 C) with or without chills when placing a thermometer under your tongue.  You soak a sanitary pad with blood within 1 hour, or you see blood clots larger than a golf ball.  Bleeding that lasts more than 6 weeks.  Vaginal discharge that smells bad.  Severe pain, cramping or tenderness in your lower belly area.  A need to urinate more frequently (use the toilet more often), more urgently (use the toilet very quickly), or it burns when you urinate.  Nausea and vomiting.  Redness, swelling or pain around a vein in your leg.  Problems breastfeeding or a red or painful area on your breast.  Chest pain and cough or are gasping for air.  Problems coping with sadness, anxiety, or depression.  If you have any concerns about hurting yourself or the baby, call your provider immediately.   You have questions or concerns after you return home.     Keep your hands clean:  Always wash your hands before touching your perineal area and stitches.  This helps reduce your risk of infection.  If your hands aren't dirty, you may use an alcohol hand-rub to clean your hands. Keep your nails clean and short.    Warning Signs after Having a Baby    Keep this paper on your fridge or somewhere else where you can see it.    Call your provider if you have any of these symptoms up to 12 weeks after having your  baby.    Thoughts of hurting yourself or your baby  Pain in your chest or trouble breathing  Severe headache not helped by pain medicine  Eyesight concerns (blurry vision, seeing spots or flashes of light, other changes to eyesight)  Fainting, shaking or other signs of a seizure    Call 9-1-1 if you feel that it is an emergency.     The symptoms below can happen to anyone after giving birth. They can be very serious. Call your provider if you have any of these warning signs.    My provider s phone number: ___[phone]____________________    Losing too much blood (hemorrhage)    Call your provider if you soak through a pad in less than an hour or pass blood clots bigger than a golf ball. These may be signs that you are bleeding too much.    Blood clots in the legs or lungs    After you give birth, your body naturally clots its blood to help prevent blood loss. Sometimes this increased clotting can happen in other areas of the body, like the legs or lungs. This can block your blood flow and be very dangerous.     Call your provider if you:  Have a red, swollen spot on the back of your leg that is warm or painful when you touch it.   Are coughing up blood.     Infection    Call your provider if you have any of these symptoms:  Fever of 100.4 F (38 C) or higher.  Pain or redness around your stitches if you had an incision.   Any yellow, white, or green fluid coming from places where you had stitches or surgery.    Mood Problems (postpartum depression)    Many people feel sad or have mood changes after having a baby. But for some people, these mood swings are worse.     Call your provider right away if you feel so anxious or nervous that you can't care for yourself or your baby.    Preeclampsia (high blood pressure)    Even if you didn't have high blood pressure when you were pregnant, you are at risk for the high blood pressure disease called preeclampsia. This risk can last up to 12 weeks after giving birth.      Call your provider if you have:   Pain on your right side under your rib cage  Sudden swelling in the hands and face    Remember: You know your body. If something doesn't feel right, get medical help.     For informational purposes only. Not to replace the advice of your health care provider. Copyright 2020 [employer]. All rights reserved. Clinically reviewed by [employer]UP[employer] Molina-[employer], MSN. Zayante [id number] - Rev 02/23.    Learning About Preeclampsia After Childbirth  What is preeclampsia?     Preeclampsia is high blood pressure and signs of organ damage, such as protein in the urine, usually after 20 weeks of pregnancy. If it's not treated, preeclampsia can harm you or your baby.  Severe preeclampsia can lead to dangerous seizures (eclampsia). When preeclampsia affects the liver, it can cause HELLP syndrome, a blood-clotting and bleeding problem. HELLP can come on quickly and can be dangerous. This is why your doctor checks you and your baby often.  Preeclampsia usually goes away after the baby is born. But symptoms may last or get worse after delivery. In rare cases, symptoms may not show up until days or even weeks after childbirth.  What are the symptoms?  Mild preeclampsia usually doesn't cause symptoms. But it may cause rapid weight gain and sudden swelling of the hands and face. Severe preeclampsia can cause symptoms such as a severe headache, vision problems, and trouble breathing. It also can cause belly pain. And you may urinate less than usual.  What can you expect after you've had preeclampsia?  In the hospital  After the baby and the placenta are delivered, preeclampsia usually starts to get better. Most people get better in the first few days after childbirth.  After having preeclampsia, you still have a risk of seizures for a day or more after childbirth. (In very rare cases, seizures happen later on.) So your doctor may have you take magnesium sulfate for a day or more to prevent  seizures. You may also take medicine to lower your blood pressure.  When you go home  Your blood pressure will most likely return to normal a few days after delivery. Your doctor will want to check your blood pressure sometime in the first week after you leave the hospital.  High blood pressure sometimes continues after childbirth. But it usually returns to normal levels with time.  Take and record your blood pressure at home if your doctor tells you to.  Ask your doctor to check your blood pressure monitor to be sure that it is accurate and that the cuff fits you. Also ask your doctor to watch you use it, to make sure that you are using it right.  Don't eat, use tobacco products, or use medicine known to raise blood pressure (such as some nasal decongestant sprays) before you take your blood pressure.  Avoid taking your blood pressure if you have just exercised or if you're nervous or upset. Rest at least 15 minutes before you take your blood pressure.  Take your medicines exactly as prescribed. Call your doctor if you think you are having a problem with your medicine.  If you smoke, quit or cut back as much as you can. Smoking and vaping can be harmful to your baby. Talk to your doctor if you need help quitting.  Eat a variety of healthy foods. Include plenty of foods high in calcium, such as dairy products, almonds, and dark leafy greens.  Long-term health  After you've had preeclampsia, you have an increased risk of high blood pressure, heart disease, stroke, and kidney disease. This may be because the same things that cause preeclampsia also cause heart and kidney disease.  To protect your health, work with your doctor on living a heart-healthy lifestyle and getting the checkups you need. Your doctor may also want you to check your blood pressure at home.  Follow-up care is a key part of your treatment and safety. Be sure to make and go to all appointments, and call your doctor if you are having problems. It's  "also a good idea to know your test results and keep a list of the medicines you take.  When should you call for help?  Share this information with your partner or a friend. They can help you watch for warning signs.  Call 911  anytime you think you may need emergency care. For example, call if:    You passed out (lost consciousness).     You have a seizure.     You have trouble breathing.     You have chest pain.   Call your doctor now or seek immediate medical care if:    You have symptoms of preeclampsia, such as:  Sudden swelling of your face, hands, or feet.  New vision problems (such as dimness, blurring, or seeing spots).  A severe headache.     Your blood pressure is very high, such as 160/110 or higher.     Your blood pressure is higher than your doctor told you it should be, or it rises quickly.     You have new nausea or vomiting.     You have pain in your belly or pelvis.     You gain weight rapidly.   Where can you learn more?  Go to [email or web address]  Enter Q718 in the search box to learn more about \"Learning About Preeclampsia After Childbirth.\"  Current as of: November 9, 2022               Content Version: 13.7    1759-9587 [employer].   Care instructions adapted under license by your healthcare professional. If you have questions about a medical condition or this instruction, always ask your healthcare professional. [employer] disclaims any warranty or liability for your use of this information.      Checking Your Blood Pressure at Home  During and after pregnancy  How do I measure my blood pressure?  It's important to take the readings at the same time each day, such as morning and evening. Take your blood pressure before taking any morning medications.  How to get the most accurate reading  30 minutes before checking your blood pressure, avoid the following:  Drinking caffeine  Drinking alcohol  Eating  Smoking  Exercising  5 minutes before checking " your blood pressure:  Use the bathroom and urinate so that you have an empty bladder.  Sit still in a chair for around 5 minutes. Stay calm and relaxed and do not talk if possible.     To check your blood pressure:  Sit up straight in a chair.  Place your feet on the floor. Don't cross your ankles or legs.  Rest your arm at the level of your heart on a table or desk or on the arm of a chair. Use the same arm every day.  Pull up your shirt sleeve. Don't take the measurement over clothes.  Wrap the blood pressure cuff around the upper part of your left arm, 1 inch (2.5 cm) above your elbow.  Fit the cuff snugly around your arm. You should be able to place only one finger between the cuff and your arm.  Position the cord so that it rests in the bend of your elbow.  continued  Press the power button.  Sit quietly while the cuff inflates and deflates.  Read the digital reading on the monitor screen and write the numbers down (record them) in a notebook.  Wait 2-3 minutes, then repeat the steps, starting at step 1.  Which features do you need?  Arm cuff monitors give the most exact readings.  Wrist and finger blood pressure monitors are often less exact.  Pick a blood pressure monitor that has passed tests to show they measure exactly. Blood pressure cuffs for sale in the [place] that have passed tests are listed on the website www.validatebp.org.  Some monitors that have passed tests are:  Omron 3 Series Upper Arm Blood Pressure Monitor (Model [id number])  Omron 5 Series Upper Arm Blood Pressure Monitor (Model QH7416)  Omron 7 Series Upper Arm Blood Pressure Monitor (Model HEM-7320)  [employer] Upper Arm Blood Pressure Monitor with Talking Function (UA 1030T)  Don't use smartphone apps. There are many smartphone apps that claim to check your blood pressure using the pulse in your wrist or finger. These don't work. They haven't passed any tests. Don't give your clinic a blood pressure reading from a smartphone marielle.  If you have  a flexible spending account (FSA) or health savings account (HSA), you may wish to pay yourself back (reimburse) for the machine and cuff. A blood pressure monitor is an allowed over-the- counter (OTC) item to pay yourself back from these accounts.  Cuff size  The size of the arm cuff is a key feature. Make sure the cuff is the right size for your arm. If the cuff isn't the right size, readings will either be too high or low.  To know what size cuff to buy, measure the distance around your bicep (upper arm).  Use a flexible measuring tape or . Place the measuring tape group home between your armpit and elbow. Measure the distance around your arm in inches.  You may need to buy a cuff apart from the machine to get the right size.    Cuff sizes and arm measurements  Small adult: 22 to 26 cm (8.7 to 10.2 inches)  Adult: 27 to 34 cm (10.6 to 13.4 inches)  Large adult: 35 to 44 cm (13.8 to 17.3 inches)  Adult thigh: 45 to 52 cm (17.7 to 20.5 inches)  For informational purposes only. Not to replace the advice of your health care provider. Photo: ID [id number]   [employer]. Text copyright   2023 [facility]. All rights reserved. Clinically reviewed by [facility]. J.G. ink [id number] - Rev 03/23.  Know Your Blood Pressure Numbers  For patients who've had a high blood pressure disorder of pregnancy  What to know about high blood pressure disorders of pregnancy  People who had high blood pressure during pregnancy may continue to have high blood pressure for up to 12 weeks after pregnancy. It can also raise your lifetime risk of chronic high blood pressure, heart disease and blood vessel disease. It is vital that you keep monitoring your blood pressure and taking steps to control it.   The general guidelines below are what your blood pressures mean.  A heart healthy lifestyle that includes blood pressure control can help reduce these risks. A good blood pressure goal  "is less than 130/80 mmHg long-term.  Talk to your provider about high blood pressure disorder of pregnancy and what this means for your lifelong health.   Know your numbers  Read \"Checking Your Blood Pressure at Home\" to learn the best way to take your blood pressure.  Refer to the next page for general guidelines about what your blood pressures mean and what to do about them. Follow these instructions unless your provider tells you something different.  For more resources, visit www.preeclampsia.org.  What to do if your blood pressure is high  Act right away if you have numbers in the yellow or red range--don't wait for a scheduled appointment.  When to call your provider  Regardless of your blood pressure, call your healthcare provider right away if you develop any of these symptoms:  Severe headache  Chest pain  Trouble breathing  Stomach pain  Changes in vision  Swelling in your hands and face.  Please say, \"I am having symptoms of high blood pressure. My provider told me to call and ask to be seen right away when I have these symptoms.\"  If you ARE pregnant OR   it has been less than 12 weeks since you delivered  Systolic pressure (top number) is....  Diastolic (bottom number) is.... Your blood pressure is....   160 or higher  or 110 or higher VERY HIGH. Check it again in 10 minutes, then contact your provider.   140 - 159  or 90 - 109 HIGH. Keep checking blood pressure 2 times a day. If your blood pressure is in this range for 2 readings, contact your provider within 24 hours. We will discuss starting or increasing your blood pressure medicine.   100 - 139  and 60 - 89 NORMAL. Your blood pressure looks great!   Keep checking it 2 times a day.   Less than 100  or Less than 60 LOW. Check your blood pressure again in   10 minutes, then contact your provider. We may need to make changes to your blood pressure medicine.     Call your provider right away if you have these symptoms: a severe headache, vision changes, " shortness of breath, chest pain, or right upper belly pain. Call even if your blood pressure is okay.  Call 9-1-1 if you feel the symptoms are severe and that it is an emergency.   If you are NOT pregnant OR   it has been more than 12 weeks since you delivered  Systolic pressure (top number) is....  Diastolic (bottom number) is.... Your blood pressure is....   Higher than 180 and/or Higher than 120 Hypertensive crisis. Call your doctor right away.   140 or higher or  90 or higher Hypertension Stage 2. Follow up with your provider.   130-139 or Less than 80 Hypertension Stage 1. Follow up with your provider.   120-129 and Less than 80 Elevated blood pressure (pre-hypertension). You are at higher risk of developing high blood pressure. Follow up with your provider.   Less than 120 and Less than 80 Normal.     Call your provider right away if you have these symptoms: a severe headache, vision changes, shortness of breath, chest pain, or right upper belly pain. Call even if your blood pressure is okay.  Call 9-1-1 if you feel the symptoms are severe and that it is an emergency.   For informational purposes only. Not to replace the advice of your health care provider. Copyright   2023 [facility]. All rights reserved. Clinically reviewed by [facility]. Mbite [id number] - 03/23.

## 2023-07-28 NOTE — PLAN OF CARE
Vital signs stable, with exception of 1 mild range pressure this AM (150/88). Postpartum assessment WDL. Pain well controlled. Up ad/carey. Voiding w/o difficulty. Tolerating a regular diet. Bottle feeding and pumping every 2-3 hours. Bonding well with  and independent with cares. Will continue to monitor. Questions/concerns addressed.

## 2023-07-28 NOTE — PLAN OF CARE
Data: Vital signs stable, assessments within normal limits.   Discharge outcomes on care plan met.   No apparent pain. BP WDL . Denies any headache, no visual problems, no epigastric pain.   Action: Review of care plan, teaching, and discharge instructions done with pt. Home meds given with instructions. BP monitor given with instructions.     Response: Pt states understanding .  All questions addressed. Pt discharged with baby  at 1815..    Goal Outcome Evaluation:      Plan of Care Reviewed With: patient, spouse

## 2023-08-01 ENCOUNTER — OFFICE VISIT (OUTPATIENT)
Dept: FAMILY MEDICINE | Facility: CLINIC | Age: 28
End: 2023-08-01
Payer: COMMERCIAL

## 2023-08-01 VITALS
HEART RATE: 84 BPM | RESPIRATION RATE: 16 BRPM | SYSTOLIC BLOOD PRESSURE: 122 MMHG | OXYGEN SATURATION: 98 % | TEMPERATURE: 97.8 F | BODY MASS INDEX: 40.14 KG/M2 | DIASTOLIC BLOOD PRESSURE: 74 MMHG | HEIGHT: 69 IN | WEIGHT: 271 LBS

## 2023-08-01 DIAGNOSIS — Z09 HOSPITAL DISCHARGE FOLLOW-UP: Primary | ICD-10-CM

## 2023-08-01 DIAGNOSIS — O14.93 PREECLAMPSIA, THIRD TRIMESTER: ICD-10-CM

## 2023-08-01 PROCEDURE — 99495 TRANSJ CARE MGMT MOD F2F 14D: CPT | Performed by: FAMILY MEDICINE

## 2023-08-01 NOTE — PROGRESS NOTES
"  Assessment & Plan     Hospital discharge follow-up    Preeclampsia, third trimester - based on home BP, will continue same dosing of nifedipine 60 mg AM and 30 mg PM for the next month. Schedule with PCP closer to home for 1 month follow up to review dosing. If BP drops over course of next month, she will reach out to me for advice. Discussed working on CV exercise and weight loss to also help with BP.       Post Medication Reconciliation Status:  Discharge medications reconciled, continue medications without change      Peyton Estes MD  [facility] SANIA Bueno is a 27 year old, presenting for the following health issues:    Pre-Eclampsia and Hospital F/U      HPI       Hospital Follow-up Visit:    Hospital/Nursing Home/IP Rehab Facility: [facility]  Date of Admission: 2023  Date of Discharge: 2023  Reason(s) for Admission: , preeclampsia    Was your hospitalization related to COVID-19? No   Problems taking medications regularly:  None  Medication changes since discharge: None  Problems adhering to non-medication therapy:  None    Summary of hospitalization:  [facility] discharge summary reviewed  Diagnostic Tests/Treatments reviewed.  Follow up needed: none  Other Healthcare Providers Involved in Patient s Care:         None  Update since discharge: improved.         Plan of care communicated with patient           Review of Systems   Constitutional, HEENT, cardiovascular, pulmonary, gi and gu systems are negative, except as otherwise noted.      Objective    /74 (Cuff Size: Adult Large)   Pulse 84   Temp 97.8  F (36.6  C) (Oral)   Resp 16   Ht 1.753 m (5' 9\")   Wt 122.9 kg (271 lb)   LMP 10/30/2022 (Exact Date)   SpO2 98%   Breastfeeding Yes   BMI 40.02 kg/m    Body mass index is 40.02 kg/m .  Physical Exam   GENERAL: healthy, alert and no distress  PSYCH: mentation appears normal, affect " normal/bright

## 2023-08-13 ENCOUNTER — HEALTH MAINTENANCE LETTER (OUTPATIENT)
Age: 28
End: 2023-08-13

## 2023-09-01 ENCOUNTER — TELEPHONE (OUTPATIENT)
Dept: PEDIATRICS | Facility: CLINIC | Age: 28
End: 2023-09-01

## 2023-09-01 NOTE — TELEPHONE ENCOUNTER
Called the patient at [phone]   - Informed the patient that she is unable to establish care with Jessica Duran PA-C   - Patient verbalized understanding but reports that she is just needing to follow-up with a provider following her vaginal delivery and the preeclampsia that she encountered   - Patient states that she was started on NIFEdipine ER OSMOTIC (ADALAT CC) 60 MG 24 hr tablet medication during her hospital stay     Jessica Duran PA-C, are you willing to complete the follow-up portion of this visit per the patient's request?     Sasha LIGHT RN   [facility]

## 2023-09-01 NOTE — TELEPHONE ENCOUNTER
Huddled with Jessica Duran PA-C   - Jessica Duran PA-C states that this patient is scheduled with her today (9/1/2023) at 11:30 AM   - Jessica Duran PA-C recommending that the patient reschedule with a provider who the patient can establish care with     Appointments in Next Year      Sep 01, 2023 11:30 AM  (Arrive by 11:10 AM)  Provider Visit with Jessica Duran PA-C  [facility] ([facility] ) [phone]     [facility], please call the patient and assist her in rescheduling with a provider who she can establish care with.     Sasha LIGHT RN   [facility]

## 2023-09-01 NOTE — TELEPHONE ENCOUNTER
Pt is aware and has an appt scheduled   Closing encounter    Easton Reaves RN on 9/1/2023 at 12:56 PM

## 2023-09-01 NOTE — TELEPHONE ENCOUNTER
This sounds and looks like an OB 6 week followup, which I unfortunately do not do and she does not have a primary provider at our clinic.  As an extender, I think she needs to be seen by an OB/GYN for a vaginal delivery followup, this is not done by me.     Please help her get scheduled.  Thank you.

## 2024-10-06 ENCOUNTER — HEALTH MAINTENANCE LETTER (OUTPATIENT)
Age: 29
End: 2024-10-06